# Patient Record
Sex: FEMALE | Race: WHITE | Employment: OTHER | URBAN - METROPOLITAN AREA
[De-identification: names, ages, dates, MRNs, and addresses within clinical notes are randomized per-mention and may not be internally consistent; named-entity substitution may affect disease eponyms.]

---

## 2022-12-30 ENCOUNTER — HOSPITAL ENCOUNTER (EMERGENCY)
Age: 87
Discharge: HOME | End: 2022-12-31
Attending: STUDENT IN AN ORGANIZED HEALTH CARE EDUCATION/TRAINING PROGRAM
Payer: MEDICARE

## 2022-12-30 ENCOUNTER — APPOINTMENT (OUTPATIENT)
Dept: GENERAL RADIOLOGY | Age: 87
End: 2022-12-30
Payer: MEDICARE

## 2022-12-30 DIAGNOSIS — S32.591A CLOSED FRACTURE OF RAMUS OF RIGHT PUBIS, INITIAL ENCOUNTER (HCC): Primary | ICD-10-CM

## 2022-12-30 PROCEDURE — 73502 X-RAY EXAM HIP UNI 2-3 VIEWS: CPT

## 2022-12-30 PROCEDURE — 99283 EMERGENCY DEPT VISIT LOW MDM: CPT

## 2022-12-31 VITALS
RESPIRATION RATE: 18 BRPM | SYSTOLIC BLOOD PRESSURE: 105 MMHG | BODY MASS INDEX: 22.38 KG/M2 | DIASTOLIC BLOOD PRESSURE: 78 MMHG | TEMPERATURE: 98.1 F | HEART RATE: 91 BPM | HEIGHT: 60 IN | WEIGHT: 114 LBS | OXYGEN SATURATION: 96 %

## 2022-12-31 NOTE — DISCHARGE INSTRUCTIONS
There appears to be an old pubic bone fracture. However, this is nonsurgical especially with your age and DNR status. You may use pain medication as needed and use a walker for ambulation.

## 2022-12-31 NOTE — ED PROVIDER NOTES
[address]      Pt Name: Augusta Espino  MRN: [id number]  [place] 8/19/1925  Date of evaluation: 12/30/2022  Provider: Hiwot Massey, [address] [place] [address]       Chief Complaint   Patient presents with    Fall     Pt presents via ems dt fall. Pt states she fell around 1800 onto her butt. Pt denies any pain on arrival. Pt states when she tries to stand her right leg does hurt pt state this has been ongoing for 2 weeks. Pt denies hitting of head, LOC, Nausea or vomiting. HISTORY OF PRESENT ILLNESS   (Location/Symptom, Timing/Onset, Context/Setting, Quality, Duration, Modifying Factors, Severity)  Note limiting factors. Augusta Espino is a 80 y.o. female who presents to the emergency department due to a fall. Patient resides in a nursing home facility and EMS was called after she had a mechanical trip and fall onto her buttock. She denies any head trauma, neck pain, back pain, nausea, vomiting. She states that she has been having pain in her right hip and right leg for 3 weeks or so and is having pain in the right hip now. No other complaints. HPI    Nursing Notes were reviewed. REVIEW OF SYSTEMS    (2-9 systems for level 4, 10 or more for level 5)     Review of Systems   Constitutional:  Negative for chills and fever. Respiratory:  Negative for cough and shortness of breath. Cardiovascular:  Negative for chest pain and palpitations. Gastrointestinal:  Negative for abdominal pain, nausea and vomiting. Musculoskeletal:         Positive for right hip pain and right leg pain. Positive for fall onto buttocks without head trauma. Skin:  Negative for rash and wound. Neurological:  Negative for weakness, numbness and headaches. Except as noted above the remainder of the review of systems was reviewed and negative. PAST MEDICAL HISTORY   History reviewed. No pertinent past medical history.       SURGICAL HISTORY     History reviewed. No pertinent surgical history. CURRENT MEDICATIONS       Previous Medications    No medications on file       ALLERGIES     Patient has no allergy information on record. FAMILY HISTORY     History reviewed. No pertinent family history. SOCIAL HISTORY       Social History     Socioeconomic History    Marital status: Unknown     Spouse name: None    Number of children: None    Years of education: None    Highest education level: None   Tobacco Use    Smoking status: Never    Smokeless tobacco: Never   Substance and Sexual Activity    Alcohol use: Never    Drug use: Never       SCREENINGS        [place] Coma Scale  Eye Opening: Spontaneous  Best Verbal Response: Oriented  Best Motor Response: Obeys commands  [place] Coma Scale Score: 15               PHYSICAL EXAM    (up to 7 for level 4, 8 or more for level 5)     ED Triage Vitals   BP Temp Temp Source Heart Rate Resp SpO2 Height Weight   12/30/22 2215 12/30/22 2215 12/30/22 2215 12/30/22 2215 12/30/22 2215 12/30/22 2215 12/30/22 2228 12/30/22 2228   134/83 98.1 °F (36.7 °C) Oral 91 18 95 % 5' (1.524 m) 114 lb (51.7 kg)       Physical Exam  Vitals and nursing note reviewed. Constitutional:       General: She is not in acute distress. Appearance: Normal appearance. She is not ill-appearing, toxic-appearing or diaphoretic. Cardiovascular:      Rate and Rhythm: Normal rate and regular rhythm. Pulmonary:      Effort: Pulmonary effort is normal.      Breath sounds: Normal breath sounds. Abdominal:      General: There is no distension. Palpations: Abdomen is soft. Tenderness: There is no abdominal tenderness. There is no guarding. Musculoskeletal:      Comments: Mild tenderness over the right hip however patient has been ambulatory and there is no pain with logroll of the right leg. Skin:     General: Skin is warm and dry. Neurological:      Mental Status: She is alert.       Comments: Right lower extremity is neurovascularly intact with normal pulses, sensation, strength, range of motion. DIAGNOSTIC RESULTS     EKG: All EKG's are interpreted by the Emergency Department Physician who either signs or Co-signs this chart in the absence of a cardiologist.      RADIOLOGY:   Non-plain film images such as CT, Ultrasound and MRI are read by the radiologist. Plain radiographic images are visualized and preliminarily interpreted by the emergency physician with the below findings:      Interpretation per the Radiologist below, if available at the time of this note:    XR HIP 2-3 VW W PELVIS RIGHT   Final Result   Mildly displaced right superior and inferior pubic ramus fractures, age   indeterminate. Mildly displaced right pubis fracture, age indeterminate. Low   bone density. Right femoral neck nail, prior plate and screw fixation of   proximal right femur without signs of hardware failure. ED BEDSIDE ULTRASOUND:   Performed by ED Physician - none    LABS:  Labs Reviewed - No data to display    All other labs were within normal range or not returned as of this dictation. EMERGENCY DEPARTMENT COURSE and DIFFERENTIAL DIAGNOSIS/MDM:   Vitals:    Vitals:    12/30/22 2215 12/30/22 2228 12/31/22 0015   BP: 134/83  105/78   Pulse: 91     Resp: 18     Temp: 98.1 °F (36.7 °C)     TempSrc: Oral     SpO2: 95%  96%   Weight:  51.7 kg (114 lb)    Height:  5' (1.524 m)        Patient is a 20-year-old female DNR CC presenting from nursing home after a fall onto her buttocks. On exam vitals normal patient is in no acute distress. No other signs of trauma. No head trauma. Heart and lung sounds normal, abdomen soft and nontender. There is mild tenderness over the right hip however there is no reproduction of pain with logroll of the right leg. The extremity is neurovascular intact. Plain film of the right hip does reveal age-indeterminate superior and inferior rami fractures.   However the patient has had multiple falls over the past week and has still been ambulatory with a walker. As her injury is nonsurgical and she is a DNR CC and wants only minimal intervention at this time, patient discharged back to nursing home facility. Patient and family agree with this plan. MDM        REASSESSMENT          CRITICAL CARE TIME       CONSULTS:  None    PROCEDURES:  Unless otherwise noted below, none     Procedures      FINAL IMPRESSION      1. Closed fracture of ramus of right pubis, initial encounter [facility])          DISPOSITION/PLAN   DISPOSITION Decision To Discharge 12/30/2022 11:42:07 PM      PATIENT REFERRED TO:  Provider Unknown, MD  Patient not available to ask    Schedule an appointment as soon as possible for a visit       DISCHARGE MEDICATIONS:  New Prescriptions    No medications on file     Controlled Substances Monitoring:     No flowsheet data found.     (Please note that portions of this note were completed with a voice recognition program.  Efforts were made to edit the dictations but occasionally words are mis-transcribed.)    Justine Velazquez DO (electronically signed)  Attending Emergency Physician            Fabricio Cooper DO  12/31/22 [id number]

## 2023-01-16 ENCOUNTER — HOSPITAL ENCOUNTER (INPATIENT)
Age: 88
LOS: 1 days | Discharge: HOSPICE | DRG: 543 | End: 2023-01-18
Attending: EMERGENCY MEDICINE | Admitting: STUDENT IN AN ORGANIZED HEALTH CARE EDUCATION/TRAINING PROGRAM
Payer: MEDICARE

## 2023-01-16 ENCOUNTER — APPOINTMENT (OUTPATIENT)
Dept: CT IMAGING | Age: 88
DRG: 543 | End: 2023-01-16
Payer: MEDICARE

## 2023-01-16 ENCOUNTER — APPOINTMENT (OUTPATIENT)
Dept: GENERAL RADIOLOGY | Age: 88
DRG: 543 | End: 2023-01-16
Payer: MEDICARE

## 2023-01-16 DIAGNOSIS — S09.90XA CLOSED HEAD INJURY, INITIAL ENCOUNTER: Primary | ICD-10-CM

## 2023-01-16 DIAGNOSIS — S32.19XA OTHER FRACTURE OF SACRUM, INITIAL ENCOUNTER FOR CLOSED FRACTURE (HCC): ICD-10-CM

## 2023-01-16 DIAGNOSIS — R29.6 RECURRENT FALLS: ICD-10-CM

## 2023-01-16 DIAGNOSIS — S32.592A CLOSED FRACTURE OF LEFT INFERIOR PUBIC RAMUS, INITIAL ENCOUNTER (HCC): ICD-10-CM

## 2023-01-16 DIAGNOSIS — R91.1 PULMONARY NODULE: ICD-10-CM

## 2023-01-16 DIAGNOSIS — S32.009A CLOSED FRACTURE OF TRANSVERSE PROCESS OF LUMBAR VERTEBRA, INITIAL ENCOUNTER (HCC): ICD-10-CM

## 2023-01-16 DIAGNOSIS — S32.512A CLOSED FRACTURE OF SUPERIOR RAMUS OF LEFT PUBIS, INITIAL ENCOUNTER (HCC): ICD-10-CM

## 2023-01-16 PROCEDURE — 72131 CT LUMBAR SPINE W/O DYE: CPT

## 2023-01-16 PROCEDURE — 72128 CT CHEST SPINE W/O DYE: CPT

## 2023-01-16 PROCEDURE — 6370000000 HC RX 637 (ALT 250 FOR IP): Performed by: EMERGENCY MEDICINE

## 2023-01-16 PROCEDURE — 99285 EMERGENCY DEPT VISIT HI MDM: CPT

## 2023-01-16 PROCEDURE — 70450 CT HEAD/BRAIN W/O DYE: CPT

## 2023-01-16 PROCEDURE — 72125 CT NECK SPINE W/O DYE: CPT

## 2023-01-16 PROCEDURE — 73502 X-RAY EXAM HIP UNI 2-3 VIEWS: CPT

## 2023-01-16 RX ORDER — METOPROLOL SUCCINATE 25 MG/1
25 TABLET, EXTENDED RELEASE ORAL DAILY
COMMUNITY

## 2023-01-16 RX ORDER — HYDROCODONE BITARTRATE AND ACETAMINOPHEN 5; 325 MG/1; MG/1
1 TABLET ORAL EVERY 6 HOURS PRN
Status: ON HOLD | COMMUNITY
End: 2023-01-18 | Stop reason: SDUPTHER

## 2023-01-16 RX ORDER — TRIAMTERENE AND HYDROCHLOROTHIAZIDE 37.5; 25 MG/1; MG/1
1 TABLET ORAL DAILY
Status: ON HOLD | COMMUNITY
End: 2023-01-18 | Stop reason: HOSPADM

## 2023-01-16 RX ORDER — DOCUSATE SODIUM 100 MG/1
100 CAPSULE, LIQUID FILLED ORAL 2 TIMES DAILY PRN
COMMUNITY

## 2023-01-16 RX ORDER — CETIRIZINE HYDROCHLORIDE 10 MG/1
10 TABLET ORAL DAILY
COMMUNITY

## 2023-01-16 RX ORDER — ACETAMINOPHEN 325 MG/1
TABLET ORAL EVERY 6 HOURS PRN
COMMUNITY

## 2023-01-16 RX ORDER — OXYCODONE HYDROCHLORIDE AND ACETAMINOPHEN 5; 325 MG/1; MG/1
1 TABLET ORAL ONCE
Status: COMPLETED | OUTPATIENT
Start: 2023-01-16 | End: 2023-01-16

## 2023-01-16 RX ADMIN — OXYCODONE HYDROCHLORIDE AND ACETAMINOPHEN 1 TABLET: 5; 325 TABLET ORAL at 22:16

## 2023-01-16 ASSESSMENT — PAIN DESCRIPTION - LOCATION: LOCATION: HIP;BUTTOCKS

## 2023-01-16 ASSESSMENT — PAIN SCALES - GENERAL
PAINLEVEL_OUTOF10: 9
PAINLEVEL_OUTOF10: 9

## 2023-01-16 ASSESSMENT — PAIN - FUNCTIONAL ASSESSMENT: PAIN_FUNCTIONAL_ASSESSMENT: 0-10

## 2023-01-17 PROBLEM — R29.6 RECURRENT FALLS: Status: ACTIVE | Noted: 2023-01-17

## 2023-01-17 LAB
ANION GAP SERPL CALCULATED.3IONS-SCNC: 11 MMOL/L (ref 9–17)
BUN BLDV-MCNC: 17 MG/DL (ref 8–23)
CALCIUM SERPL-MCNC: 9.2 MG/DL (ref 8.6–10.4)
CHLORIDE BLD-SCNC: 96 MMOL/L (ref 98–107)
CO2: 25 MMOL/L (ref 20–31)
CREAT SERPL-MCNC: 0.85 MG/DL (ref 0.5–0.9)
GFR SERPL CREATININE-BSD FRML MDRD: >60 ML/MIN/1.73M2
GLUCOSE BLD-MCNC: 160 MG/DL (ref 70–99)
HCT VFR BLD CALC: 27.1 % (ref 36–46)
HEMOGLOBIN: 8.7 G/DL (ref 12–16)
INR BLD: 1
MCH RBC QN AUTO: 30.1 PG (ref 26–34)
MCHC RBC AUTO-ENTMCNC: 32.1 G/DL (ref 31–37)
MCV RBC AUTO: 93.8 FL (ref 80–100)
PDW BLD-RTO: 15.5 % (ref 12.5–15.4)
PLATELET # BLD: 350 K/UL (ref 140–450)
PMV BLD AUTO: 8.4 FL (ref 8–14)
POTASSIUM SERPL-SCNC: 3.9 MMOL/L (ref 3.7–5.3)
PROTHROMBIN TIME: 10.7 SEC (ref 9.4–12.6)
RBC # BLD: 2.89 M/UL (ref 4–5.2)
SODIUM BLD-SCNC: 132 MMOL/L (ref 135–144)
WBC # BLD: 8.2 K/UL (ref 3.5–11)

## 2023-01-17 PROCEDURE — 36415 COLL VENOUS BLD VENIPUNCTURE: CPT

## 2023-01-17 PROCEDURE — 1210000000 HC MED SURG R&B

## 2023-01-17 PROCEDURE — 85610 PROTHROMBIN TIME: CPT

## 2023-01-17 PROCEDURE — 97535 SELF CARE MNGMENT TRAINING: CPT

## 2023-01-17 PROCEDURE — 97530 THERAPEUTIC ACTIVITIES: CPT

## 2023-01-17 PROCEDURE — 6370000000 HC RX 637 (ALT 250 FOR IP): Performed by: NURSE PRACTITIONER

## 2023-01-17 PROCEDURE — 2580000003 HC RX 258: Performed by: NURSE PRACTITIONER

## 2023-01-17 PROCEDURE — 97166 OT EVAL MOD COMPLEX 45 MIN: CPT

## 2023-01-17 PROCEDURE — 85027 COMPLETE CBC AUTOMATED: CPT

## 2023-01-17 PROCEDURE — 80048 BASIC METABOLIC PNL TOTAL CA: CPT

## 2023-01-17 PROCEDURE — 6360000002 HC RX W HCPCS: Performed by: NURSE PRACTITIONER

## 2023-01-17 PROCEDURE — 97162 PT EVAL MOD COMPLEX 30 MIN: CPT

## 2023-01-17 RX ORDER — SODIUM CHLORIDE 0.9 % (FLUSH) 0.9 %
5-40 SYRINGE (ML) INJECTION EVERY 12 HOURS SCHEDULED
Status: DISCONTINUED | OUTPATIENT
Start: 2023-01-17 | End: 2023-01-18 | Stop reason: HOSPADM

## 2023-01-17 RX ORDER — ONDANSETRON 2 MG/ML
4 INJECTION INTRAMUSCULAR; INTRAVENOUS EVERY 6 HOURS PRN
Status: DISCONTINUED | OUTPATIENT
Start: 2023-01-17 | End: 2023-01-18 | Stop reason: HOSPADM

## 2023-01-17 RX ORDER — MAGNESIUM SULFATE 1 G/100ML
1000 INJECTION INTRAVENOUS PRN
Status: DISCONTINUED | OUTPATIENT
Start: 2023-01-17 | End: 2023-01-18 | Stop reason: HOSPADM

## 2023-01-17 RX ORDER — HYDROCODONE BITARTRATE AND ACETAMINOPHEN 5; 325 MG/1; MG/1
1 TABLET ORAL EVERY 6 HOURS PRN
Status: DISCONTINUED | OUTPATIENT
Start: 2023-01-17 | End: 2023-01-18 | Stop reason: HOSPADM

## 2023-01-17 RX ORDER — MORPHINE SULFATE 4 MG/ML
4 INJECTION, SOLUTION INTRAMUSCULAR; INTRAVENOUS ONCE
Status: DISCONTINUED | OUTPATIENT
Start: 2023-01-17 | End: 2023-01-17

## 2023-01-17 RX ORDER — POTASSIUM CHLORIDE 7.45 MG/ML
10 INJECTION INTRAVENOUS PRN
Status: DISCONTINUED | OUTPATIENT
Start: 2023-01-17 | End: 2023-01-18 | Stop reason: HOSPADM

## 2023-01-17 RX ORDER — TRIAMTERENE AND HYDROCHLOROTHIAZIDE 37.5; 25 MG/1; MG/1
1 TABLET ORAL DAILY
Status: DISCONTINUED | OUTPATIENT
Start: 2023-01-17 | End: 2023-01-18

## 2023-01-17 RX ORDER — SODIUM CHLORIDE 9 MG/ML
INJECTION, SOLUTION INTRAVENOUS PRN
Status: DISCONTINUED | OUTPATIENT
Start: 2023-01-17 | End: 2023-01-18 | Stop reason: HOSPADM

## 2023-01-17 RX ORDER — ACETAMINOPHEN 325 MG/1
650 TABLET ORAL EVERY 6 HOURS PRN
Status: DISCONTINUED | OUTPATIENT
Start: 2023-01-17 | End: 2023-01-18 | Stop reason: HOSPADM

## 2023-01-17 RX ORDER — ONDANSETRON 4 MG/1
4 TABLET, ORALLY DISINTEGRATING ORAL EVERY 8 HOURS PRN
Status: DISCONTINUED | OUTPATIENT
Start: 2023-01-17 | End: 2023-01-18 | Stop reason: HOSPADM

## 2023-01-17 RX ORDER — SODIUM CHLORIDE 0.9 % (FLUSH) 0.9 %
10 SYRINGE (ML) INJECTION PRN
Status: DISCONTINUED | OUTPATIENT
Start: 2023-01-17 | End: 2023-01-18 | Stop reason: HOSPADM

## 2023-01-17 RX ORDER — POTASSIUM CHLORIDE 20 MEQ/1
40 TABLET, EXTENDED RELEASE ORAL PRN
Status: DISCONTINUED | OUTPATIENT
Start: 2023-01-17 | End: 2023-01-18 | Stop reason: HOSPADM

## 2023-01-17 RX ORDER — POLYETHYLENE GLYCOL 3350 17 G/17G
17 POWDER, FOR SOLUTION ORAL DAILY PRN
Status: DISCONTINUED | OUTPATIENT
Start: 2023-01-17 | End: 2023-01-18 | Stop reason: HOSPADM

## 2023-01-17 RX ORDER — METOPROLOL SUCCINATE 25 MG/1
25 TABLET, EXTENDED RELEASE ORAL DAILY
Status: DISCONTINUED | OUTPATIENT
Start: 2023-01-17 | End: 2023-01-18 | Stop reason: HOSPADM

## 2023-01-17 RX ADMIN — HYDROCODONE BITARTRATE AND ACETAMINOPHEN 1 TABLET: 5; 325 TABLET ORAL at 17:45

## 2023-01-17 RX ADMIN — SODIUM CHLORIDE, PRESERVATIVE FREE 10 ML: 5 INJECTION INTRAVENOUS at 21:02

## 2023-01-17 RX ADMIN — APIXABAN 2.5 MG: 2.5 TABLET, FILM COATED ORAL at 21:02

## 2023-01-17 RX ADMIN — ONDANSETRON 4 MG: 2 INJECTION INTRAMUSCULAR; INTRAVENOUS at 21:02

## 2023-01-17 RX ADMIN — HYDROCODONE BITARTRATE AND ACETAMINOPHEN 1 TABLET: 5; 325 TABLET ORAL at 10:08

## 2023-01-17 RX ADMIN — HYDROCODONE BITARTRATE AND ACETAMINOPHEN 1 TABLET: 5; 325 TABLET ORAL at 03:37

## 2023-01-17 RX ADMIN — METOPROLOL SUCCINATE 25 MG: 25 TABLET, EXTENDED RELEASE ORAL at 10:08

## 2023-01-17 RX ADMIN — APIXABAN 2.5 MG: 2.5 TABLET, FILM COATED ORAL at 10:08

## 2023-01-17 RX ADMIN — TRIAMTERENE AND HYDROCHLOROTHIAZIDE 1 TABLET: 37.5; 25 TABLET ORAL at 10:08

## 2023-01-17 ASSESSMENT — PAIN DESCRIPTION - DESCRIPTORS
DESCRIPTORS: ACHING

## 2023-01-17 ASSESSMENT — PAIN SCALES - GENERAL
PAINLEVEL_OUTOF10: 8
PAINLEVEL_OUTOF10: 7

## 2023-01-17 ASSESSMENT — PAIN DESCRIPTION - LOCATION
LOCATION: BACK;BUTTOCKS
LOCATION: BACK
LOCATION: BACK
LOCATION: GENERALIZED

## 2023-01-17 NOTE — ED NOTES
Writer called report to [employer] on inpatient unit. Pt ready for transport to room 338.      Elvin Reilly RN  01/17/23 [id number]

## 2023-01-17 NOTE — PROGRESS NOTES
Physical Therapy  Facility/Department: [facility] ICU  Physical Therapy Initial Assessment    Name: Fabian Jones  : 1925  MRN: [id number]  Date of Service: 2023    Discharge Recommendations:  Patient would benefit from continued therapy after discharge      Fabian Jones is a 80 y.o. female who presents for evaluation after a fall. The patient has history of dementia and is a poor historian. Based on chart review she has had multiple falls and this has increased recently. [facility] a fall that resulted in right superior and inferior pubic rami fractures, thoracic and lumbar compression fractures, and bilateral rib fractures on 12/15/2022. The patient reports that tonight she was attempting to walk to the bathroom on her own when she lost her balance and fell onto her left side. She states that she struck the back of her head and landed on her left hip. She was able to call for help and staff immediately came to her side. The patient denies loss of consciousness. Based on chart review she is on Eliquis for paroxysmal A. fib. The patient complains of generalized pain with increased pain to the back of her head and to her left hip. She states her pain is worse with movement. She has not taken any medications for pain and does not list any other provoking or palliating factors. Based on her paperwork she is taking Norco at home. The patient is a DNR CC. She arrives by EMS and has refused IV or IM medications. She denies fever, chills, neck pain, vision changes, chest pain, shortness of breath, abdominal pain, urinary/bowel symptoms, focal weakness, numbness, tingling, recent illness. Patient Diagnosis(es): The primary encounter diagnosis was Closed head injury, initial encounter.  Diagnoses of Closed fracture of superior ramus of left pubis, initial encounter [facility]), Closed fracture of left inferior pubic ramus, initial encounter [facility]), Closed fracture of transverse process of lumbar vertebra, initial encounter [facility]), Other fracture of sacrum, initial encounter for closed fracture [facility]), and Pulmonary nodule were also pertinent to this visit. Past Medical History:  has a past medical history of Atrial fibrillation ([address] Cognitive communication deficit, Compression fracture of L2 ([address] Compression fracture of thoracic vertebra ([address] Dementia ([facility] [address] Encephalopathy, Fall, Hypertension, Muscle weakness, Osteoarthritis, Pneumonia, Rhabdomyolysis, Rib fractures, Sinusitis, and Spinal stenosis. Past Surgical History:  has a past surgical history that includes Kyphosis surgery. Assessment   Body Structures, Functions, Activity Limitations Requiring Skilled Therapeutic Intervention: Decreased functional mobility ; Decreased safe awareness;Decreased balance  Assessment: The patient presents with recurrent falls with decreased safety awareness and h/o dementia. The patient would require 24 hour care at discharge due to safety and assistance level required with all mobility. Therapy Prognosis: Fair  Decision Making: Medium Complexity  Requires PT Follow-Up: Yes  Activity Tolerance  Activity Tolerance: Treatment limited secondary to decreased cognition     Plan   Physcial Therapy Plan  General Plan:  (5-6x/week)  Current Treatment Recommendations: Strengthening, Balance training, Functional mobility training, Gait training, Stair training, Home exercise program, Safety education & training, Therapeutic activities, Transfer training, Endurance training  Safety Devices  Type of Devices: Nurse notified, Left in bed, Patient at risk for falls, Gait belt, Bed alarm in place, All gale prominences offloaded  Restraints  Restraints Initially in Place: No     Restrictions  Restrictions/Precautions  Restrictions/Precautions: Fall Risk  Position Activity Restriction  Other position/activity restrictions: WBAT, with no restrictions per Dr. Aníbal Rouse.  Also has order for Up with assistance. Subjective   General  Patient assessed for rehabilitation services?: Yes  Family / Caregiver Present: No  Diagnosis: recurrent falls  Subjective  Subjective: The patient wishes to go to the bathroom. Social/Functional History  Social/Functional History  Type of Home: Facility ([address] [place] [address])  ADL Assistance: Needs assistance  Ambulation Assistance: Needs assistance  Transfer Assistance: Needs assistance  Additional Comments: The patient was at [address] [place] [address] prior to this admission. The patient was not able to answer her level of assistance, but likely required assistance for all mobility for safety. The patient would no tbe safe to ambulate alone or transfer alone based on current presentation. Vision/Hearing  Hearing  Hearing: Within functional limits    Cognition   Orientation  Overall Orientation Status: Impaired  Orientation Level: Oriented to person;Disoriented to place; Disoriented to time;Disoriented to situation  Cognition  Overall Cognitive Status: Exceptions  Arousal/Alertness: Delayed responses to stimuli  Following Commands: Follows one step commands with increased time; Follows one step commands with repetition  Attention Span: Difficulty dividing attention; Difficulty attending to directions  Safety Judgement: Decreased awareness of need for assistance  Problem Solving: Decreased awareness of errors  Insights: Decreased awareness of deficits  Initiation: Requires cues for all  Sequencing: Requires cues for all     Objective     Generalized pain, but the patient did not rate. AROM RLE (degrees)  RLE AROM: WFL  AROM LLE (degrees)  LLE AROM : WFL  AROM RUE (degrees)  RUE AROM : WFL  AROM LUE (degrees)  LUE AROM : WFL  Strength RLE  Comment: unable to follow commands well enough to test formally  R Hip Flexion: 3+/5; At least  R Hip ABduction: 3+/5; At least  R Hip ADduction: 3+/5; At least  R Knee Flexion: 3+/5; At least  R Knee Extension: 3+/5; At least  R Ankle Dorsiflexion: 3+/5; At least  R Ankle Plantar flexion: 3+/5; At least  Strength LLE  Strength LLE: Exception  Comment: unable to follow commands well enough to test formally  L Hip Flexion: 3+/5; At least  L Hip ABduction: 3+/5; At least  L Hip ADduction: 3+/5; At least  L Knee Flexion: 3+/5; At least  L Knee Extension: 3+/5; At least  L Ankle Dorsiflexion: 3+/5; At least  L Ankle Plantar Flexion: 3+/5; At least  Strength RUE  Comment: unable to follow commands well enough to test formally, but uses both UE equally  Strength LUE  Comment: unable to follow commands well enough to test formally, but uses both UE equally        Bed Mobility Training  Bed Mobility Training: Yes  Overall Level of Assistance: Moderate assistance;Assist X2  Transfer Training  Transfer Training: Yes  Overall Level of Assistance: Moderate assistance;Assist X2  Sit to Stand: Moderate assistance;Assist X2  Stand to Sit: Moderate assistance;Assist X2  Bed mobility  Supine to Sit: Moderate assistance;2 Person assistance  Sit to Supine: Moderate assistance;2 Person assistance  Scooting: Moderate assistance;2 Person assistance  Transfers  Sit to Stand: 2 Person Assistance; Moderate Assistance  Stand to Sit: Moderate Assistance;2 Person Assistance  Stand Pivot Transfers: 2 Person Assistance; Moderate Assistance  Comment: transfer to commode with MoDAx2; equally unsteady with or without walker for transfers  Ambulation  Surface: Level tile  Device: Rolling Walker  Assistance: Moderate assistance;2 Person assistance  Quality of Gait: attempted to ambulate to commode or toilet, however the patient took 2 steps and was very unsteady and unpredictable therefore transfer to and from commode only performed.      Balance  Sitting - Static: Fair  Sitting - Dynamic: Fair;-  Standing - Static: Poor  Standing - Dynamic: Poor    AM-PAC Score  AM-PAC Inpatient Mobility Raw Score : 10 (01/17/23 1203)  AM-PAC Inpatient T-Scale Score : 32.29 (01/17/23 1203)  Mobility Inpatient CMS 0-100% Score: 76.75 (01/17/23 1203)  Mobility Inpatient CMS G-Code Modifier : CL (01/17/23 1203)    Goals  Short Term Goals  Time Frame for Short Term Goals: 14 visits  Short Term Goal 1: The patient will perform transfers with CGA and RW. Short Term Goal 2: The patient will ambulate 80ft with RW and CGA. Short Term Goal 3: The patient will improve BLE strength to 4/5 for safe transfers and gait. Short Term Goal 4: The patient will be able to perform bed mobility with CGA. Patient Goals   Patient Goals : Did not state       Education  Patient Education  Education Given To: Patient  Education Provided: Role of Therapy  Education Method: Verbal  Barriers to Learning: Cognition  Education Outcome: Continued education needed; Unable to demonstrate understanding      Therapy Time   Individual Concurrent Group Co-treatment   Time In 1020         Time Out 1056         Minutes 36         Timed Code Treatment Minutes: 9 Minutes       Elizabeth Romano PT

## 2023-01-17 NOTE — PROGRESS NOTES
Occupational Therapy  Facility/Department: [facility]  Occupational Therapy Initial Assessment       Name: Ranjana Cronin  : 1925  MRN: [id number]  Date of Service: 2023    Chief Complaint   Patient presents with    Fall    Hip Pain    Head Injury     Discharge Recommendations:  Patient would benefit from continued therapy after discharge, 24 hour supervision or assist  OT Equipment Recommendations  Equipment Needed: No     Patient Diagnosis(es): The primary encounter diagnosis was Closed head injury, initial encounter. Diagnoses of Closed fracture of superior ramus of left pubis, initial encounter [facility]), Closed fracture of left inferior pubic ramus, initial encounter [facility]), Closed fracture of transverse process of lumbar vertebra, initial encounter ([facility] [address] Other fracture of sacrum, initial encounter for closed fracture [facility]), and Pulmonary nodule were also pertinent to this visit. Past Medical History:  has a past medical history of Atrial fibrillation ([address] Cognitive communication deficit, Compression fracture of L2 ([address] Compression fracture of thoracic vertebra ([address] Dementia ([address] Encephalopathy, Fall, Hypertension, Muscle weakness, Osteoarthritis, Pneumonia, Rhabdomyolysis, Rib fractures, Sinusitis, and Spinal stenosis. Past Surgical History:  has a past surgical history that includes Kyphosis surgery. Assessment   Performance deficits / Impairments: Decreased functional mobility ; Decreased ADL status; Decreased balance;Decreased strength;Decreased cognition;Decreased fine motor control;Decreased safe awareness;Decreased endurance  Assessment: Pt has demonstrated deficits at this time with her ability to safely complete daily tasks, decreased balance and impaired mobility, decreased endurance / activity tolerance, impaired cognition, poor cognition // safety. At this time, the Pt has decreased ability to return to [facility] and prior living situation.   She will benefit from continued participation in acute and post-acute OT services to improve regain functional activity participation / improve safety and reduce risk of future falls. Prognosis: Fair  Decision Making: Medium Complexity  REQUIRES OT FOLLOW-UP: Yes  Activity Tolerance  Activity Tolerance: Patient limited by fatigue;Patient limited by pain;Treatment limited secondary to decreased cognition;Treatment limited secondary to agitation          Plan   Occupational Therapy Plan  Times Per Week: 4-5x/week  Times Per Day: Once a day  Current Treatment Recommendations: Strengthening, Balance training, Functional mobility training, Endurance training, ROM, Equipment evaluation, education, & procurement, Patient/Caregiver education & training, Pain management, Cognitive reorientation, Self-Care / ADL, Safety education & training, Cognitive/Perceptual training       Restrictions  Restrictions/Precautions  Restrictions/Precautions: Fall Risk  Position Activity Restriction  Other position/activity restrictions: WBAT, with no restrictions per Dr. Ranjit Cabrera. Also has order for Up with assistance. Subjective   General  Patient assessed for rehabilitation services?: Yes  Family / Caregiver Present: No  Diagnosis: Per EMR and H&P:  Patient is a 80 y.o. Non- / non  female who presents with Fall, Hip Pain, and Head Injury. She suffers from dementia and was brought to the hospital after a fall. Patient has had multiple falls and did sustain an acute fracture of the sacrum and L5 transverse process on the left. She has multiple age-indeterminate compression fractures and multiple vertebroplasty's have been noted. This is 1 of many falls and she is weightbearing as tolerated. She was brought from an extended care facility. She was evaluated in the emergency room and family are requesting placement to a memory care unit. At this point in time the patient feels reasonably well.   She has pain however it is under reasonable control with Norco.  She can be weightbearing as tolerated and will titrate her pain control as needed. The patient denies any questions or concerns. She is pleasantly confused. Per Hospitalist, Pt can be WBAT, will not require Ortho consult d/t age-indeterminate fractures. Subjective  Subjective: RN approved Pt to be seen for OT // PT Evaluations. General Comment  Comments: Pt was agreeable to participate with instruction // coaxing // encouragement. Social/Functional History  Social/Functional History  Lives With: Alone  Type of Home: Facility  ADL Assistance: Needs assistance  Toileting: Needs assistance  Homemaking Assistance: Needs assistance  Ambulation Assistance: Needs assistance  Transfer Assistance: Needs assistance  Active : No  Additional Comments: The patient was at [address] [place] [address] prior to this admission. The patient was not able to answer her level of assistance, but likely required assistance for all mobility for safety. The patient would no tbe safe to ambulate alone or transfer alone based on current presentation. Objective   Safety Devices  Type of Devices: Nurse notified; Left in bed;Patient at risk for falls;Gait belt;Bed alarm in place; All gale prominences offloaded  Restraints  Restraints Initially in Place: No    Bed Mobility Training  Bed Mobility Training: Yes  Overall Level of Assistance: Moderate assistance;Assist X2 (HOB elevated, Bilateral Bedrails, Significant increase in time // effort // cues)  Interventions: Visual cues; Verbal cues;Manual cues; Safety awareness training (Max Cues task initiation & sequencing // accurate integration of BUEs)  Rolling: Moderate assistance;Assist X2  Supine to Sit: Moderate assistance;Assist X2  Sit to Supine: Moderate assistance;Assist X2  Scooting: Moderate assistance;Assist X2    Balance  Sitting: With support  Standing: With support  Transfer Training  Transfer Training: Yes  Overall Level of Assistance:  Moderate assistance;Assist X2;Additional time (Initial attempts at Sit to Stand // Stand to Sit with RW (very unsuccessful, poor motor planning, poor direction following d/t cognitive deficits).  //  Transitioned to Mod x2 (side by side // fface to face) Pt demo'd improvement in participation.)  Interventions: Visual cues; Verbal cues;Manual cues; Safety awareness training (Max Cues task initiation & motor planning // accurate technique // hand over hand initiation on BUEs and Hands)  Sit to Stand: Moderate assistance;Assist X2;Additional time  Stand to Sit: Moderate assistance; Additional time;Assist X2  Toilet Transfer: Moderate assistance;Assist X2;Adaptive equipment; Additional time (Transfer to // from Standard BSC to EOB. Mod x2 (including both side by side transfer and face to face transfers). Significant increase in time // effort.)    Gait  Overall Level of Assistance:  (Initial attempts at Sit to Stand // Stand to Sit with RW (very unsuccessful, poor motor planning, poor direction following d/t cognitive deficits). Unable to continue with mobility this date. Currently participating in stand-pivot t/f only.)    AROM: Within functional limits (Not able to formally assess d/t cognitive impairments. However, Pt was able to demonstrate Bilateral UE AROM // Bilateral UE Strength & Grasp WFL during function this date.)  Strength: Within functional limits  Coordination: Within functional limits    ADL  Grooming: Minimal assistance; Increased time to complete;Verbal cueing  Grooming Skilled Clinical Factors: Sitting EOB to perform hand hygiene. UE Dressing: Moderate assistance;Verbal cueing; Increased time to complete  UE Dressing Skilled Clinical Factors: Sitting EOB, UBD to doff robe then don clean gown. LE Dressing: Maximum assistance; Increased time to complete;Verbal cueing  LE Dressing Skilled Clinical Factors: Pt attempted to don Bilateral socks sitting EOB, but was unable to follow // motor plan these instructions. Toileting Skilled Clinical Factors: Using Standard BSC, Pt participated in [place] (standing with Max Assist), Brief Management (Max Assist). Additional Comments: Max VCs / Instruction and Intermittent hand over hand for facilitation // initiation of task participation. Required significant increase in time for task completion of all tasks. Activity Tolerance  Activity Tolerance: Treatment limited secondary to decreased cognition    Hearing  Hearing: Within functional limits  Cognition  Overall Cognitive Status: Exceptions  Arousal/Alertness: Delayed responses to stimuli  Following Commands: Follows one step commands with increased time; Follows one step commands with repetition  Attention Span: Difficulty dividing attention; Difficulty attending to directions  Safety Judgement: Decreased awareness of need for assistance;Decreased awareness of need for safety  Problem Solving: Decreased awareness of errors  Insights: Decreased awareness of deficits  Initiation: Requires cues for all  Sequencing: Requires cues for all  Orientation  Overall Orientation Status: Impaired  Orientation Level: Oriented to person;Disoriented to place; Disoriented to time;Disoriented to situation    Education Given To: Patient;Staff  Education Provided: Role of Therapy;Plan of Care;Orientation      AM-[facility] Score  [facility] Inpatient Daily Activity Raw Score: 11 (01/17/23 1736)  -PAC Inpatient ADL T-Scale Score : 29.04 (01/17/23 1736)  ADL Inpatient CMS 0-100% Score: 70.42 (01/17/23 1736)  ADL Inpatient CMS G-Code Modifier : CL (01/17/23 1736)      Goals  Short Term Goals  Time Frame for Short Term Goals: Within 14 treatment sessions -  Short Term Goal 1: Pt will demo Fair Tolerance to // Participation with progressive HEP (BUE ROM // Strength & Resistance // Bilat Grasp). Short Term Goal 2: Pt will maintain Dynamic Sitting Tolerance (>10 mins) while participating in UB ADLs.   Short Term Goal 3: Pt will participate in ADL // Bathroom Transfers with CGA and without LOB. Short Term Goal 4: Pt will maintain Dynamic Standing Tolerance (5-6 mins) to improve functional task participation.        Therapy Time   Individual Concurrent Group Co-treatment   Time In 1020         Time Out 1056         Minutes 36         Timed Code Treatment Minutes: 10 Minutes (ADL)       MARLEY Loredo, OTR/L

## 2023-01-17 NOTE — CARE COORDINATION
Case Management Assessment  Initial Evaluation    Date/Time of Evaluation: 1/17/2023 3:09 PM  Assessment Completed by: Radha Daniel RN    If patient is discharged prior to next notation, then this note serves as note for discharge by case management. Patient Name: Katy Conti                   YOB: 1925  Diagnosis: Pulmonary nodule [R91.1]  Recurrent falls [R29.6]  Closed head injury, initial encounter [S09.90XA]  Closed fracture of transverse process of lumbar vertebra, initial encounter ([facility] [address] [S32.009A]  Closed fracture of left inferior pubic ramus, initial encounter ([address] Ulises Arambula  Other fracture of sacrum, initial encounter for closed fracture ([address] [S32.19XA]  Closed fracture of superior ramus of left pubis, initial encounter ([address] Bret Leavitt                   Date / Time: 1/16/2023  9:52 PM    Patient Admission Status: Inpatient   Readmission Risk (Low < 19, Mod (19-27), High > 27): Readmission Risk Score: 15.2    Current PCP: Jodi Kowalski MD  PCP verified by CM? Chart Reviewed: Yes      History Provided by: Child/Family, Patient, Medical Record  Patient Orientation: Person    Patient Cognition: Dementia / Early Alzheimer's    Hospitalization in the last 30 days (Readmission):  No    If yes, Readmission Assessment in  Navigator will be completed. Advance Directives:      Code Status: DNR-CC   Patient's Primary Decision Maker is:      Primary Decision MakerLorilee Form Child - [phone]    Discharge Planning:    Patient lives with: Alone Type of Home: [place]  Primary Care Giver:  Other (Comment)  Patient Support Systems include: Children   Current Financial resources:    Current community resources:    Current services prior to admission: None            Current DME:              Type of Home Care services:  None    ADLS  Prior functional level: Assistance with the following:  Current functional level: Assistance with the following:    PT AM-PAC: 10 /24  OT AM-PAC:   /24    Family can provide assistance at DC: Would you like Case Management to discuss the discharge plan with any other family members/significant others, and if so, who? Plans to Return to Present Housing: Unknown at present  Other Identified Issues/Barriers to RETURNING to current housing: NA  Potential Assistance needed at discharge: [address], Lincoln Leos            Potential DME:    Patient expects to discharge to: [address] for transportation at discharge: Family    Financial    Payor: MEDICARE / Plan: MEDICARE PART A AND B / Product Type: *No Product type* /     Does insurance require precert for SNF: Yes    Potential assistance Purchasing Medications:    Meds-to-Beds request: No    No Pharmacies Listed    Notes:    Factors facilitating achievement of predicted outcomes:     Barriers to discharge: Additional Case Management Notes:   Spoke with family at length about different options for long term care. Long term care at a nursing facility vs. Memory care. Spoke with son, Mary Roper [facility]), who is requesting a Hospice consult. Referral to [facility]. Dr. Edy cohen. Sophia Carrizales [address] with [facility] of [address] [place] [address]. They will complete remote review tomorrow morning and meet with family at their home to discuss options at 1300 1/18.             The Plan for Transition of Care is related to the following treatment goals of Pulmonary nodule [R91.1]  Recurrent falls [R29.6]  Closed head injury, initial encounter [S09.90XA]  Closed fracture of transverse process of lumbar vertebra, initial encounter ([facility] [address] [S32.009A]  Closed fracture of left inferior pubic ramus, initial encounter ([address] [S32.592A]  Other fracture of sacrum, initial encounter for closed fracture ([address] [S32.19XA]  Closed fracture of superior ramus of left pubis, initial encounter ([facility] [address] [X53.285T]    IF APPLICABLE: The Patient and/or patient representative Nedra Florian and her family were provided with a choice of provider and agrees with the discharge plan. Freedom of choice list with basic dialogue that supports the patient's individualized plan of care/goals and shares the quality data associated with the providers was provided to: Patient   Patient Representative Name:       The Patient and/or Patient Representative Agree with the Discharge Plan?  Yes    Cherry Knox RN  Case Management Department  Ph: [phone] Fax: [phone]

## 2023-01-17 NOTE — H&P
[facility]  Office: [address], DO, [facility], [place] Gustavowes, DO, Patrick Mcleod, DO, Ayde Dowd, DO, Dagmar White MD, Hellen Sumner MD, Maria T Simmons MD, Maile Norman MD,  Sandra Woods MD, Richard Hill MD, Shaneka Boothe, DO, Kaden Farmer MD,  Brittaney Sommers MD, Dilcia Macdonald MD, Maureen Russo DO, Iris Moreno MD, Ariel Maya MD, Aiyana Hernandez DO, Jessy Edwards MD, Serenity Huston MD, Lorie Bazan MD, Levon Crews MD, Diane Soliz DO, Delfina Benavidez MD, sIra López MD, Noah Jordan, CNP,  Yonathan Obrien, CNP, Arlen Bates, CNP, Sheri Olivarez, CNP,  Nora Mcmahon, [facility], Ese Benz, CNP, Michael Ritter CNP, Keven Stone, CNP, Layla Roque, CNP, Karina Lang, CNP, Muna Lomeli PAJanineC, Lucille Mcclellan, CNS, Belinda Hall, CNP, Priyank Gibson, [address]    HISTORY AND PHYSICAL EXAMINATION            Date:   1/17/2023  Patient name:  Arpan Zapata  Date of admission:  1/16/2023  9:52 PM  MRN:   [id number]  Account:  [de-identified]  YOB: 1925  PCP:    Lili Mcdonald MD  Room:   [address] [place] [address]  Code Status:    DNR-CC    Chief Complaint:     Chief Complaint   Patient presents with    Fall    Hip Pain    Head Injury     Patient seen in follow-up for fall, patient states \"I am sore\"    History Obtained From:     patient, electronic medical record    History of Present Illness:     Arpan Zapata is a 80 y.o. Non- / non  female who presents with Fall, Hip Pain, and Head Injury   and is admitted to the hospital for the management of Recurrent falls. This very pleasant 55-year-old female who suffers from dementia and was brought to the hospital after a fall. She has had multiple falls and did sustain an acute fracture of the sacrum and L5 transverse process on the left.   She has multiple age-indeterminate compression fractures and multiple vertebroplasty's have been noted. This is 1 of many falls and she is weightbearing as tolerated. She was brought from an extended care facility. She was evaluated in the emergency room and family are requesting placement to a memory care unit. At this point in time the patient feels reasonably well. She has pain however it is under reasonable control with Norco.  She can be weightbearing as tolerated and will titrate her pain control as needed. The patient denies any questions or concerns. She is pleasantly confused. Past Medical History:     Past Medical History:   Diagnosis Date    Atrial fibrillation ([address]     Cognitive communication deficit     Compression fracture of L2 ([address]     Compression fracture of thoracic vertebra (HCC)     Dementia [address]     Encephalopathy     Fall     Hypertension     Muscle weakness     Osteoarthritis     Pneumonia     Rhabdomyolysis     Rib fractures     Sinusitis     Spinal stenosis         Past Surgical History:     Past Surgical History:   Procedure Laterality Date    KYPHOSIS SURGERY          Medications Prior to Admission:     Prior to Admission medications    Medication Sig Start Date End Date Taking? Authorizing Provider   acetaminophen (TYLENOL) 325 MG tablet Take by mouth every 6 hours as needed for Pain   Yes Historical Provider, MD   apixaban (ELIQUIS) 2.5 MG TABS tablet Take 2.5 mg by mouth 2 times daily   Yes Historical Provider, MD   cetirizine (ZYRTEC) 10 MG tablet Take 10 mg by mouth daily   Yes Historical Provider, MD   docusate sodium (COLACE) 100 MG capsule Take 100 mg by mouth 2 times daily as needed for Constipation   Yes Historical Provider, MD   HYDROcodone-acetaminophen (NORCO) 5-325 MG per tablet Take 1 tablet by mouth every 6 hours as needed for Pain.    Yes Historical Provider, MD   metoprolol succinate (TOPROL XL) 25 MG extended release tablet Take 25 mg by mouth daily   Yes Historical Provider, MD   triamterene-hydroCHLOROthiazide Norvel Liter) 37.5-25 MG per tablet Take 1 tablet by mouth daily In the afternoon   Yes Historical Provider, MD        Allergies:     Aspirin, Penicillins, and Tetracyclines & related    Social History:     Tobacco:    reports that she has never smoked. She has never used smokeless tobacco.  Alcohol:      reports no history of alcohol use.  Drug Use:  reports no history of drug use.    Family History:     History reviewed. No pertinent family history.    Review of Systems:     Positive and Negative as described in HPI.    CONSTITUTIONAL:  negative for fevers, chills, sweats, fatigue, weight loss  HEENT:  negative for vision, hearing changes, runny nose, throat pain  RESPIRATORY:  negative for shortness of breath, cough, congestion, wheezing  CARDIOVASCULAR:  negative for chest pain, palpitations  GASTROINTESTINAL:  negative for nausea, vomiting, diarrhea, constipation, change in bowel habits, abdominal pain   GENITOURINARY:  negative for difficulty of urination, burning with urination, frequency   INTEGUMENT:  negative for rash, skin lesions, easy bruising   HEMATOLOGIC/LYMPHATIC:  negative for swelling/edema   ALLERGIC/IMMUNOLOGIC:  negative for urticaria , itching  ENDOCRINE:  negative increase in drinking, increase in urination, hot or cold intolerance  MUSCULOSKELETAL: Patient is aware of pain associated with her fall  NEUROLOGICAL:  negative for headaches, dizziness, lightheadedness, numbness, pain, tingling extremities  BEHAVIOR/PSYCH:  negative for depression, anxiety    Physical Exam:   BP (!) 144/89   Pulse 92   Temp 98.1 °F (36.7 °C)   Resp 16   SpO2 97%   Temp (24hrs), Av °F (36.7 °C), Min:97.9 °F (36.6 °C), Max:98.1 °F (36.7 °C)    No results for input(s): POCGLU in the last 72 hours.    Intake/Output Summary (Last 24 hours) at 2023 1308  Last data filed at 2023 0448  Gross per 24 hour   Intake --   Output 350 ml   Net -350 ml       General Appearance:  alert, well appearing, and in no acute  distress  Mental status: Oriented only to self  Head:  normocephalic, atraumatic  Eye: no icterus, redness, pupils equal and reactive, extraocular eye movements intact, conjunctiva clear  Ear: normal external ear, no discharge, hearing intact  Nose:  no drainage noted  Mouth: mucous membranes moist  Neck: supple, no carotid bruits, thyroid not palpable  Lungs: Bilateral equal air entry, clear to ausculation, no wheezing, rales or rhonchi, normal effort  Cardiovascular: normal rate, regular rhythm, no murmur, gallop, rub  Abdomen: Soft, nontender, nondistended, normal bowel sounds, no hepatomegaly or splenomegaly  Neurologic: There are no new focal motor or sensory deficits, normal muscle tone and bulk, no abnormal sensation, normal speech, cranial nerves II through XII grossly intact  Skin: No gross lesions, rashes, bruising or bleeding on exposed skin area  Extremities:  peripheral pulses palpable, no pedal edema or calf pain with palpation  Psych: normal affect     Investigations:      Laboratory Testing:  Recent Results (from the past 24 hour(s))   Basic Metabolic Panel w/ Reflex to MG    Collection Time: 01/17/23  5:52 AM   Result Value Ref Range    Glucose 160 (H) 70 - 99 mg/dL    BUN 17 8 - 23 mg/dL    Creatinine 0.85 0.50 - 0.90 mg/dL    Est, Glom Filt Rate >60 >60 mL/min/1.73m2    Calcium 9.2 8.6 - 10.4 mg/dL    Sodium 132 (L) 135 - 144 mmol/L    Potassium 3.9 3.7 - 5.3 mmol/L    Chloride 96 (L) 98 - 107 mmol/L    CO2 25 20 - 31 mmol/L    Anion Gap 11 9 - 17 mmol/L   Protime-INR    Collection Time: 01/17/23  5:52 AM   Result Value Ref Range    Protime 10.7 9.4 - 12.6 sec    INR 1.0    CBC    Collection Time: 01/17/23  5:52 AM   Result Value Ref Range    WBC 8.2 3.5 - 11.0 k/uL    RBC 2.89 (L) 4.0 - 5.2 m/uL    Hemoglobin 8.7 (L) 12.0 - 16.0 g/dL    Hematocrit 27.1 (L) 36 - 46 %    MCV 93.8 80 - 100 fL    MCH 30.1 26 - 34 pg    MCHC 32.1 31 - 37 g/dL    RDW 15.5 (H) 12.5 - 15.4 %    Platelets 628 543 - 216 k/uL    MPV 8.4 8.0 - 14.0 fL       Imaging/Diagnostics:  CT Head W/O Contrast    Result Date: 1/16/2023  No acute intracranial abnormality. Generalized atrophy and chronic small vessel ischemic white matter disease.     CT CSpine W/O Contrast    Result Date: 1/16/2023  No acute fracture or traumatic malalignment of the cervical spine.     CT THORACIC SPINE WO CONTRAST    Result Date: 1/16/2023  Multiple compression fractures and vertebroplasties age indeterminate. Multiple pulmonary nodules right lower lobe.  Recommend follow-up CT chest with IV contrast non emergently.  Differential considerations include infectious, inflammatory, and neoplastic etiologies. Sensitivity and specificity limited due to demineralization.     CT LUMBAR SPINE WO CONTRAST    Result Date: 1/17/2023  Acute fracture of the sacrum and L5 transverse process on the left.  Multiple compression fractures, age indeterminate.  Multiple vertebroplasties and postsurgical changes as above.  Demineralization decreases sensitivity and specificity.  MRI recommended for further characterization.     XR HIP 2-3 VW W PELVIS LEFT    Result Date: 1/16/2023  Possible new fractures superior inferior pubic rami on the left.  Right superior and inferior pubic rami fractures unchanged.  Sensitivity limited due to demineralization.  Postop changes as above.       Assessment :      Hospital Problems             Last Modified POA    * (Principal) Recurrent falls 1/17/2023 Yes       Plan:     Patient status inpatient in the Med/Surge    Fall with sacral/L5 transverse process fracture  Pain control, weightbearing as tolerated  Dementia  Supportive care  Anemia  Etiology unknown, would not aggressively work-up at 89 years of age  Essential hypertension  Home medications resumed, may need to hold thiazide diuretic if patient has worsening of hyponatremia  Mild hyponatremia  Check morning labs, possibly secondary to thiazide diuretic, poor solute intake, SIADH  History  of DVT/PE  Will continue Eliquis for now however risk versus benefit of anticoagulation long term will have to be assessed given her recurrent falls    Consultations:   IP CONSULT TO SOCIAL WORK  IP CONSULT TO SPIRITUAL SERVICES    Patient is admitted as inpatient status because of co-morbidities listed above, severity of signs and symptoms as outlined, requirement for current medical therapies and most importantly because of direct risk to patient if care not provided in a hospital setting.  Expected length of stay > 48 hours.    Oliverio Stiles DO  1/17/2023  1:08 PM    Copy sent to Dr. Florencio Larry MD

## 2023-01-17 NOTE — ED NOTES
Call made to pt hermila Isidro to notifiy of pt  visit to ED.     Florina Wiggins RN  01/16/23 0298

## 2023-01-17 NOTE — ED PROVIDER NOTES
[facility] Emergency Department  [address]  [facility] [address]  Phone: [phone]  Fax: [phone]      Pt Name: Swati Ly  MRN:[id number]  Birthdate 8/19/1925  Date of evaluation: 1/16/2023      CHIEF COMPLAINT       Chief Complaint   Patient presents with    Fall    Hip Pain    Head Injury       HISTORY OF PRESENT ILLNESS   Swati Ly is a 89 y.o. female who presents for evaluation after a fall.  The patient has history of dementia and is a poor historian.  Based on chart review she has had multiple falls and this has increased recently.  She is currently at [facility] for rehab after a fall that resulted in right superior and inferior pubic rami fractures, thoracic and lumbar compression fractures, and bilateral rib fractures on 12/15/2022.  The patient reports that tonight she was attempting to walk to the bathroom on her own when she lost her balance and fell onto her left side.  She states that she struck the back of her head and landed on her left hip.  She was able to call for help and staff immediately came to her side.  The patient denies loss of consciousness.  Based on chart review she is on Eliquis for paroxysmal A. fib.  The patient complains of generalized pain with increased pain to the back of her head and to her left hip.  She states her pain is worse with movement.  She has not taken any medications for pain and does not list any other provoking or palliating factors.  Based on her paperwork she is taking Norco at home.  The patient is a DNR CC.  She arrives by EMS and has refused IV or IM medications.  She denies fever, chills, neck pain, vision changes, chest pain, shortness of breath, abdominal pain, urinary/bowel symptoms, focal weakness, numbness, tingling, recent illness.    REVIEW OF SYSTEMS     Positive: Headache, back pain, left hip pain  Ten point review of systems was reviewed and is negative unless otherwise noted in the HPI    PAST  MEDICAL HISTORY    has a past medical history of Atrial fibrillation ([address] Cognitive communication deficit, Compression fracture of L2 ([address] Compression fracture of thoracic vertebra ([address] Dementia ([address] Encephalopathy, Fall, Hypertension, Muscle weakness, Osteoarthritis, Pneumonia, Rhabdomyolysis, Rib fractures, Sinusitis, and Spinal stenosis. SURGICAL HISTORY      has a past surgical history that includes Kyphosis surgery. CURRENT MEDICATIONS       Previous Medications    ACETAMINOPHEN (TYLENOL) 325 MG TABLET    Take by mouth every 6 hours as needed for Pain    APIXABAN (ELIQUIS) 2.5 MG TABS TABLET    Take 2.5 mg by mouth 2 times daily    CETIRIZINE (ZYRTEC) 10 MG TABLET    Take 10 mg by mouth daily    DOCUSATE SODIUM (COLACE) 100 MG CAPSULE    Take 100 mg by mouth 2 times daily as needed for Constipation    HYDROCODONE-ACETAMINOPHEN (NORCO) 5-325 MG PER TABLET    Take 1 tablet by mouth every 6 hours as needed for Pain. METOPROLOL SUCCINATE (TOPROL XL) 25 MG EXTENDED RELEASE TABLET    Take 25 mg by mouth daily    TRIAMTERENE-HYDROCHLOROTHIAZIDE (MAXZIDE-25) 37.5-25 MG PER TABLET    Take 1 tablet by mouth daily In the afternoon       ALLERGIES     is allergic to aspirin, penicillins, and tetracyclines & related. FAMILY HISTORY     has no family status information on file. family history is not on file. SOCIAL HISTORY      reports that she has never smoked. She has never used smokeless tobacco. She reports that she does not drink alcohol and does not use drugs. PHYSICAL EXAM     INITIAL VITALS:  oral temperature is 97.9 °F (36.6 °C). Her blood pressure is 130/92 (abnormal) and her pulse is 89. Her respiration is 14 and oxygen saturation is 98%.      CONSTITUTIONAL: Appears uncomfortable, nontoxic, refusing any IV initially  SKIN: warm, dry, no jaundice, hives or petechiae  EYES: clear conjunctiva, non-icteric sclera, pupils 3 mm, equal, round reactive to light, extraocular movements intact  HENT: normocephalic, atraumatic, moist mucus membranes. No hemotympanum, gonzalez sign, raccoon eyes or septal hematoma. NECK: Nontender and supple with no nuchal rigidity, full range of motion  PULMONARY: clear to auscultation without wheezes, rhonchi, or rales, normal excursion, no accessory muscle use and no stridor  CARDIOVASCULAR: regular rate, rhythm. Strong radial pulses with intact distal perfusion. Capillary refill <2 seconds. GASTROINTESTINAL: soft, non-tender, non-distended, no palpable masses, no rebound or guarding. Pelvis stable. GENITOURINARY: No costovertebral angle tenderness to palpation  MUSCULOSKELETAL: There is left lumbar paraspinal muscle tenderness to palpation and pain over the posterior sacrum. No midline spinal tenderness, step off or deformity. Extremities are otherwise nontender to palpation and nonerythematous. Compartments soft. No peripheral edema. NEUROLOGIC: alert and oriented x self and place but not time, GCS 15, poor historian, normal speech. Moves all extremities x 4 without motor or sensory deficit. Normal perirectal tone and sensation. Downgoing Babinski's. Normal proprioception. Normal DTRs. Strength 5/5 with dorsi and plantar flexion of the bilateral feet. Able to lift both legs off the bed. PSYCHIATRIC: Dementia    DIAGNOSTIC RESULTS     EKG:  None    RADIOLOGY:   CT Head W/O Contrast    Result Date: 1/16/2023  EXAMINATION: CT OF THE HEAD WITHOUT CONTRAST  1/16/2023 10:56 pm TECHNIQUE: CT of the head was performed without the administration of intravenous contrast. Automated exposure control, iterative reconstruction, and/or weight based adjustment of the mA/kV was utilized to reduce the radiation dose to as low as reasonably achievable. COMPARISON: None.  HISTORY: ORDERING SYSTEM PROVIDED HISTORY: fall, struck back of head TECHNOLOGIST PROVIDED HISTORY: fall, struck back of head Decision Support Exception - unselect if not a suspected or confirmed emergency medical condition->Emergency Medical Condition (MA) Reason for Exam: fall, struck back of head FINDINGS: BRAIN/VENTRICLES: There is generalized atrophy and there is patchy periventricular and subcortical white matter low attenuation that is nonspecific but most consistent with chronic small vessel ischemia. There is no acute intracranial hemorrhage, mass effect or midline shift. No abnormal extra-axial fluid collection. The gray-white differentiation is maintained without evidence of an acute infarct. ORBITS: The visualized portion of the orbits demonstrate no acute abnormality. SINUSES: The visualized paranasal sinuses and mastoid air cells demonstrate no acute abnormality. SOFT TISSUES/SKULL:  No acute abnormality of the visualized skull or soft tissues. No acute intracranial abnormality. Generalized atrophy and chronic small vessel ischemic white matter disease. CT CSpine W/O Contrast    Result Date: 1/16/2023  EXAMINATION: CT OF THE CERVICAL SPINE WITHOUT CONTRAST 1/16/2023 10:57 pm TECHNIQUE: CT of the cervical spine was performed without the administration of intravenous contrast. Multiplanar reformatted images are provided for review. Automated exposure control, iterative reconstruction, and/or weight based adjustment of the mA/kV was utilized to reduce the radiation dose to as low as reasonably achievable. COMPARISON: None. HISTORY: ORDERING SYSTEM PROVIDED HISTORY: fall, struck back of head, h/o dementia TECHNOLOGIST PROVIDED HISTORY: fall, struck back of head, h/o dementia Decision Support Exception - unselect if not a suspected or confirmed emergency medical condition->Emergency Medical Condition (MA) Reason for Exam: fall, struck back of head, h/o dementia FINDINGS: BONES/ALIGNMENT: There is a minimal degenerative retrolisthesis C5 on C6. Vertebral body alignment is otherwise unremarkable.   Cervical vertebral body heights are normal.  There is no acute fracture or traumatic malalignment. Remote vertebral augmentation at T6 incompletely visualized. DEGENERATIVE CHANGES: There is moderate degenerative disc disease at C5-C6 with disc space narrowing, discogenic sclerosis and spondylosis. There is multilevel facet arthropathy, right greater than left. SOFT TISSUES: There is no prevertebral soft tissue swelling. No acute fracture or traumatic malalignment of the cervical spine. CT THORACIC SPINE WO CONTRAST    Result Date: 1/16/2023  EXAMINATION: CT OF THE THORACIC SPINE WITHOUT CONTRAST  1/16/2023 10:57 pm: TECHNIQUE: CT of the thoracic spine was performed without the administration of intravenous contrast. Multiplanar reformatted images are provided for review. Automated exposure control, iterative reconstruction, and/or weight based adjustment of the mA/kV was utilized to reduce the radiation dose to as low as reasonably achievable. COMPARISON: None. HISTORY: ORDERING SYSTEM PROVIDED HISTORY: fall, back pain, h/o compression fractures TECHNOLOGIST PROVIDED HISTORY: fall, back pain, h/o compression fractures Reason for Exam: fall, back pain, h/o compression fractures FINDINGS: BONES/ALIGNMENT: There is normal alignment of the spine except for moderate kyphosis except for compression fractures at T6 T11, T12, L1 and L2. Vertebroplasties are noted at T6 T11, L1 and L2. No significant retropulsion. Diffuse demineralization decreases sensitivity. .  The vertebral body heights are maintained. No osseous destructive lesion is seen. DEGENERATIVE CHANGES: No gross spinal canal stenosis or bony neural foraminal narrowing of the thoracic spine. SOFT TISSUES: No paraspinal mass is seen. Multiple pulmonary nodules right lower lobe totaling at least 4 measuring up to 6 mm. Multiple compression fractures and vertebroplasties age indeterminate. Multiple pulmonary nodules right lower lobe. Recommend follow-up CT chest with IV contrast non emergently.   Differential considerations include infectious, inflammatory, and neoplastic etiologies. Sensitivity and specificity limited due to demineralization. CT LUMBAR SPINE WO CONTRAST    Result Date: 1/17/2023  EXAMINATION: CT OF THE LUMBAR SPINE WITHOUT CONTRAST  1/16/2023 TECHNIQUE: CT of the lumbar spine was performed without the administration of intravenous contrast. Multiplanar reformatted images are provided for review. Adjustment of mA and/or kV according to patient size was utilized. Automated exposure control, iterative reconstruction, and/or weight based adjustment of the mA/kV was utilized to reduce the radiation dose to as low as reasonably achievable. COMPARISON: None HISTORY: ORDERING SYSTEM PROVIDED HISTORY: fall, back pain, h/o compression fractures TECHNOLOGIST PROVIDED HISTORY: fall, back pain, h/o compression fractures Decision Support Exception - unselect if not a suspected or confirmed emergency medical condition->Emergency Medical Condition (MA) Reason for Exam: fall, back pain, h/o compression fractures FINDINGS: BONES/ALIGNMENT: Diffuse demineralization decreases sensitivity. Mild scoliosis. Nondisplaced fracture of the left sacral ala and possibly the right. There is also an avulsion fracture of the transverse process of L5 on the left. Compression fractures noted at T11 through L4. Mild retropulsion T12 otherwise no significant retropulsion. Vertebroplasties noted at T11, L1 and L2. DEGENERATIVE CHANGES: Spondylosis. Vacuum disc phenomena at L5-S1 in particular. Slight anterior subluxation L5-S1. Posterior inter spinous hardware L4-5. trefoil type spinal stenosis at L2-3 and L3-4. SOFT TISSUES/RETROPERITONEUM: No paraspinal mass is seen. Acute fracture of the sacrum and L5 transverse process on the left. Multiple compression fractures, age indeterminate. Multiple vertebroplasties and postsurgical changes as above. Demineralization decreases sensitivity and specificity.   MRI recommended for further characterization. XR HIP 2-3 VW W PELVIS LEFT    Result Date: 1/16/2023  EXAMINATION: ONE XRAY VIEW OF THE PELVIS AND TWO XRAY VIEWS LEFT HIP 1/16/2023 11:00 pm COMPARISON: December 30, 2022 HISTORY: ORDERING SYSTEM PROVIDED HISTORY: fall, left hip pain TECHNOLOGIST PROVIDED HISTORY: fall, left hip pain Reason for Exam: fall, left hip pain FINDINGS: Fractures superior inferior pubic rami on the right unchanged. Possible new superior and inferior pubic rami fractures on the left. Diffuse demineralization decreases sensitivity and specificity. Hardware right hip. Hips are in anatomic alignment. Sacroiliac joints appear normal. Degenerative changes lumbar spine. Hardware lumbar spine. Dedicated images left hip negative for fracture otherwise. Possible new fractures superior inferior pubic rami on the left. Right superior and inferior pubic rami fractures unchanged. Sensitivity limited due to demineralization. Postop changes as above. XR HIP 2-3 VW W PELVIS RIGHT    Result Date: 12/30/2022  EXAMINATION: ONE XRAY VIEW OF THE PELVIS AND TWO XRAY VIEWS RIGHT HIP 12/30/2022 10:58 pm COMPARISON: None. HISTORY: ORDERING SYSTEM PROVIDED HISTORY: fall TECHNOLOGIST PROVIDED HISTORY: fall Reason for Exam: hip pain FINDINGS: Mildly displaced right superior and inferior pubic ramus fractures, age indeterminate. Mildly displaced right pubis fracture, age indeterminate. Low bone density. Right femoral neck nail, prior plate and screw fixation of proximal right femur without signs of hardware failure. Degenerative changes of the pubis, lower lumbar spine. Mildly displaced right superior and inferior pubic ramus fractures, age indeterminate. Mildly displaced right pubis fracture, age indeterminate. Low bone density. Right femoral neck nail, prior plate and screw fixation of proximal right femur without signs of hardware failure. LABS:  No results found for this visit on 01/16/23.     EMERGENCY DEPARTMENT COURSE:        The patient was given the following medications:  Orders Placed This Encounter   Medications    oxyCODONE-acetaminophen (PERCOCET) 5-325 MG per tablet 1 tablet    DISCONTD: morphine injection 4 mg    apixaban (ELIQUIS) tablet 2.5 mg     Order Specific Question:   Indication of Use     Answer:   History of DVT/PE (indefinite)    HYDROcodone-acetaminophen (NORCO) 5-325 MG per tablet 1 tablet        Vitals:    Vitals:    01/16/23 2233 01/17/23 0135 01/17/23 0547   BP: (!) 158/77 (!) 166/106 (!) 130/92   Pulse: 94 92 89   Resp: 18 20 14   Temp: 98.1 °F (36.7 °C)  97.9 °F (36.6 °C)   TempSrc: Oral  Oral   SpO2: 96%  98%     -------------------------  BP: (!) 130/92, Temp: 97.9 °F (36.6 °C), Heart Rate: 89, Resp: 14    CONSULTS:  IP CONSULT TO SOCIAL WORK    CRITICAL CARE:   None    PROCEDURES:  None    DIAGNOSIS/ MDM:   Gennaro Riggins is a 80 y.o. female who presents with headache and left hip pain after a fall. She has history of dementia and is a DNR CC. No signs of basilar skull fracture on exam.  She is neurovascularly intact. X-ray of the left hip shows possible new fractures along the superior inferior pubic rami on the left. There are unchanged right superior and inferior pubic rami on the right. CT of the lumbar spine shows an acute fracture of the sacrum and L5 transverse process on the left. There are multiple compression fractures that are age-indeterminate. She has multiple vertebroplasty's and postsurgical changes. She has multiple pulmonary nodules in the right lower lobe. CT cervical spine and CT head showed no acute process. The patient continues to refuse a IV for me. We did place a pure wick to reduce movement and pain. She was agreeable to a Percocet which did help with her pain. I spoke to her son, Otto Kellogg, who is her POA. He recommends pain control at this time and does not want any advanced testing.   He states that the patient has had multiple falls despite increasing the number of aides that are with her. He states that he and his family have talked about trying to place her in a long-term memory care unit. He would like to have her admitted and speak with social work tomorrow for placement. FINAL IMPRESSION      1. Closed head injury, initial encounter    2.  Closed fracture of superior ramus of left pubis, initial encounter ([address]    3. Closed fracture of left inferior pubic ramus, initial encounter ([address]    4. Closed fracture of transverse process of lumbar vertebra, initial encounter ([address]    5. Other fracture of sacrum, initial encounter for closed fracture ([address]    6. Pulmonary nodule          DISPOSITION/PLAN   DISPOSITION Admitted 01/17/2023 12:28:20 AM              (Please note that portions of this note were completed with a voice recognitionprogram.  Efforts were made to edit the dictations but occasionally words are mis-transcribed.)    Blane Corona DO, [facility]  Emergency Physician Attending          Blane Corona DO  01/17/23 3476

## 2023-01-17 NOTE — PROGRESS NOTES
[facility]  PROGRESS NOTE    Shift date: 1/17/23  Shift day: Tuesday   Shift # 1    Room # 671/760-35   Name: Luis Bland                Shinto: Yazidi  Referral:  MANDO Farr Date & Time: 1/16/2023  9:52 PM    Assessment:  Luis Bland is a 80 y.o. female in the hospital. Writer learned from the nurse that Pt's daughter had requested a visit from the . Upon entering the room writer observes Patient was lying in bed. Patient's daughter, Petar Martin, was sitting on the couch. Pt opened her eyes and greeted writer. Pt at first did not understand writer's role. Daughter, who greeted writer, clarified. Pt was somewhat disoriented and conversed with disjointed phrases. Daughter asked if Pt would like writer to offer a prayer. Pt voiced her requests, and Daughter voiced her requests. Daughter was tearful as writer offered a prayer. Pt and Daughter joined writer in reciting the [employer]. Daughter noted that Pt's strength is her perseverance. Daughter laughed as she acknowledged this strength. Intervention:  Writer introduced self and title as . Writer inquired how Pt and Daughter were doing. Writer offered a prayer and words of support and encouragement. Writer affirmed Pt's strengths. Outcome:  Daughter and Pt thanked writer for the visit. Plan:  Chaplains will remain available to offer spiritual and emotional support as needed. 01/17/23 1637   Encounter Summary   Service Provided For: Patient and family together   Referral/Consult From: Nurse   Support System Children;Family members   Last Encounter  01/17/23   Complexity of Encounter Moderate   Begin Time 1600   End Time  1610   Total Time Calculated 10 min   Encounter    Type Initial Screen/Assessment   Spiritual/Emotional needs   Type Spiritual Support   Assessment/Intervention/Outcome   Assessment Calm;Questioning cee; Impaired resilience; Tearful;Sad   Intervention Sustaining Presence/Ministry of presence; Active listening;Explored/Affirmed feelings, thoughts, concerns;Explored Coping Skills/Resources;Prayer (assurance of)/[place]   Outcome Expressed feelings, needs, and concerns;Expressed Gratitude   Plan and Referrals   Plan/Referrals No future visits requested       Electronically signed by Amelia Braga on 1/17/2023 at 4:41 PM.  [facility]  [phone]

## 2023-01-17 NOTE — PLAN OF CARE
Problem: Discharge Planning  Goal: Discharge to home or other facility with appropriate resources  Outcome: Progressing  Flowsheets (Taken 1/17/2023 0815)  Discharge to home or other facility with appropriate resources:   Identify barriers to discharge with patient and caregiver   Arrange for needed discharge resources and transportation as appropriate   Identify discharge learning needs (meds, wound care, etc)   Refer to discharge planning if patient needs post-hospital services based on physician order or complex needs related to functional status, cognitive ability or social support system     Problem: Pain  Goal: Verbalizes/displays adequate comfort level or baseline comfort level  Outcome: Progressing   - Medicated for pain as ordered, as needed.

## 2023-01-17 NOTE — ED NOTES
[place] updated on waiting for results of imaging and poc.  Will re-update with further results     Carolin Wilson RN  01/16/23 3753

## 2023-01-18 VITALS
HEART RATE: 85 BPM | SYSTOLIC BLOOD PRESSURE: 108 MMHG | BODY MASS INDEX: 22.43 KG/M2 | DIASTOLIC BLOOD PRESSURE: 71 MMHG | WEIGHT: 114.86 LBS | TEMPERATURE: 98.4 F | OXYGEN SATURATION: 99 % | RESPIRATION RATE: 16 BRPM

## 2023-01-18 LAB
ANION GAP SERPL CALCULATED.3IONS-SCNC: 8 MMOL/L (ref 9–17)
BUN BLDV-MCNC: 18 MG/DL (ref 8–23)
CALCIUM SERPL-MCNC: 8.8 MG/DL (ref 8.6–10.4)
CHLORIDE BLD-SCNC: 95 MMOL/L (ref 98–107)
CO2: 26 MMOL/L (ref 20–31)
CREAT SERPL-MCNC: 0.94 MG/DL (ref 0.5–0.9)
GFR SERPL CREATININE-BSD FRML MDRD: 55 ML/MIN/1.73M2
GLUCOSE BLD-MCNC: 144 MG/DL (ref 70–99)
HCT VFR BLD CALC: 27.1 % (ref 36–46)
HEMOGLOBIN: 8.5 G/DL (ref 12–16)
MCH RBC QN AUTO: 30.1 PG (ref 26–34)
MCHC RBC AUTO-ENTMCNC: 31.4 G/DL (ref 31–37)
MCV RBC AUTO: 96.1 FL (ref 80–100)
PDW BLD-RTO: 15.8 % (ref 12.5–15.4)
PLATELET # BLD: 350 K/UL (ref 140–450)
PMV BLD AUTO: 8.8 FL (ref 8–14)
POTASSIUM SERPL-SCNC: 3.9 MMOL/L (ref 3.7–5.3)
RBC # BLD: 2.82 M/UL (ref 4–5.2)
SODIUM BLD-SCNC: 129 MMOL/L (ref 135–144)
WBC # BLD: 7.4 K/UL (ref 3.5–11)

## 2023-01-18 PROCEDURE — 36415 COLL VENOUS BLD VENIPUNCTURE: CPT

## 2023-01-18 PROCEDURE — 80048 BASIC METABOLIC PNL TOTAL CA: CPT

## 2023-01-18 PROCEDURE — 6370000000 HC RX 637 (ALT 250 FOR IP): Performed by: NURSE PRACTITIONER

## 2023-01-18 PROCEDURE — 2580000003 HC RX 258: Performed by: NURSE PRACTITIONER

## 2023-01-18 PROCEDURE — 85027 COMPLETE CBC AUTOMATED: CPT

## 2023-01-18 PROCEDURE — 6360000002 HC RX W HCPCS: Performed by: HOSPITALIST

## 2023-01-18 RX ORDER — ENOXAPARIN SODIUM 100 MG/ML
30 INJECTION SUBCUTANEOUS DAILY
Status: DISCONTINUED | OUTPATIENT
Start: 2023-01-19 | End: 2023-01-18 | Stop reason: HOSPADM

## 2023-01-18 RX ORDER — MORPHINE SULFATE 2 MG/ML
2 INJECTION, SOLUTION INTRAMUSCULAR; INTRAVENOUS
Status: DISCONTINUED | OUTPATIENT
Start: 2023-01-18 | End: 2023-01-18 | Stop reason: HOSPADM

## 2023-01-18 RX ORDER — HYDROCODONE BITARTRATE AND ACETAMINOPHEN 5; 325 MG/1; MG/1
1 TABLET ORAL EVERY 6 HOURS PRN
Qty: 10 TABLET | Refills: 0 | Status: SHIPPED | OUTPATIENT
Start: 2023-01-18 | End: 2023-01-25

## 2023-01-18 RX ADMIN — APIXABAN 2.5 MG: 2.5 TABLET, FILM COATED ORAL at 09:30

## 2023-01-18 RX ADMIN — HYDROCODONE BITARTRATE AND ACETAMINOPHEN 1 TABLET: 5; 325 TABLET ORAL at 12:56

## 2023-01-18 RX ADMIN — SODIUM CHLORIDE, PRESERVATIVE FREE 10 ML: 5 INJECTION INTRAVENOUS at 10:28

## 2023-01-18 RX ADMIN — METOPROLOL SUCCINATE 25 MG: 25 TABLET, EXTENDED RELEASE ORAL at 09:30

## 2023-01-18 RX ADMIN — MORPHINE SULFATE 2 MG: 2 INJECTION, SOLUTION INTRAMUSCULAR; INTRAVENOUS at 19:46

## 2023-01-18 RX ADMIN — HYDROCODONE BITARTRATE AND ACETAMINOPHEN 1 TABLET: 5; 325 TABLET ORAL at 04:00

## 2023-01-18 ASSESSMENT — PAIN SCALES - GENERAL
PAINLEVEL_OUTOF10: 6
PAINLEVEL_OUTOF10: 0
PAINLEVEL_OUTOF10: 6
PAINLEVEL_OUTOF10: 6

## 2023-01-18 ASSESSMENT — PAIN DESCRIPTION - LOCATION
LOCATION: GENERALIZED
LOCATION: BACK

## 2023-01-18 ASSESSMENT — PAIN DESCRIPTION - DESCRIPTORS
DESCRIPTORS: ACHING
DESCRIPTORS: ACHING

## 2023-01-18 NOTE — PLAN OF CARE
Problem: Discharge Planning  Goal: Discharge to home or other facility with appropriate resources  1/18/2023 0116 by Lorenzo Yañze RN  Outcome: Progressing     Problem: Pain  Goal: Verbalizes/displays adequate comfort level or baseline comfort level  1/18/2023 0116 by Lorenzo Yañez RN  Outcome: Progressing   PRN pain medication ordered (See Mar). Problem: Skin/Tissue Integrity  Goal: Absence of new skin breakdown  Description: 1. Monitor for areas of redness and/or skin breakdown  2. Assess vascular access sites hourly  3. Every 4-6 hours minimum:  Change oxygen saturation probe site  4. Every 4-6 hours:  If on nasal continuous positive airway pressure, respiratory therapy assess nares and determine need for appliance change or resting period. Outcome: Progressing     Problem: Safety - Adult  Goal: Free from fall injury  Outcome: Progressing   Fall assessment performed and appropriate measures implemented. Room freed from clutter. Bed in lowest position with wheels locked. Call light in place. ID band in place.     Problem: ABCDS Injury Assessment  Goal: Absence of physical injury  Outcome: Progressing

## 2023-01-18 NOTE — PROGRESS NOTES
Physical Therapy        Physical Therapy Cancel Note      DATE: 2023    NAME: Georgina Huntley  MRN: [id number]   : 1925      Patient not seen this date for Physical Therapy due to:     Other: patient transferring to hospice later today per caregiver      Electronically signed by Eulogio Angulo PT on 2023 at 3:03 PM

## 2023-01-18 NOTE — PROGRESS NOTES
Occupational [address]  Occupational Therapy Not Seen Note    DATE: 2023    NAME: Ranjana Cronin  MRN: [id number]   : 1925      Patient not seen this date for Occupational Therapy due to:    Patient is not appropriate for active participation in OT d/t:  Pt leaving with Hospice Services.         Electronically signed by MARLEY West OTR/L on 2023 at 4:48 PM

## 2023-01-18 NOTE — DISCHARGE INSTR - COC
Continuity of Care Form    Patient Name: Katy Conti   :  1925  MRN:  [id number]    Admit date:  2023  Discharge date:  ***    Code Status Order: DNR-CC   Advance Directives:     Admitting Physician:  Uma Guerrero MD  PCP: Jodi Kowalski MD    Discharging Nurse: [facility] Unit/Room#: 687/280-25  Discharging Unit Phone Number: ***    Emergency Contact:   Extended Emergency Contact Information  Primary Emergency Contact: Sanna Ly  [place] Phone: [phone]  Relation: Child  Secondary Emergency Contact: [facility] Phone: [phone]  Relation: Child    Past Surgical History:  Past Surgical History:   Procedure Laterality Date    KYPHOSIS SURGERY         Immunization History: There is no immunization history on file for this patient. Active Problems:  Patient Active Problem List   Diagnosis Code    Recurrent falls R29.6       Isolation/Infection:   Isolation            No Isolation          Patient Infection Status       None to display            Nurse Assessment:  Last Vital Signs: /71   Pulse 85   Temp 98.4 °F (36.9 °C) (Oral)   Resp 16   Wt 114 lb 13.8 oz (52.1 kg)   SpO2 99%   BMI 22.43 kg/m²     Last documented pain score (0-10 scale): Pain Level: 6  Last Weight:   Wt Readings from Last 1 Encounters:   23 114 lb 13.8 oz (52.1 kg)     Mental Status:  {IP PT MENTAL STATUS:48593}    IV Access:  { DIANA IV ACCESS:635190965}    Nursing Mobility/ADLs:  Walking   {CHP DME MKSO:245084272}  Transfer  {CHP DME QYSR:413550704}  Bathing  {CHP DME OBJZ:233355730}  Dressing  {CHP DME TOFM:900292508}  Toileting  {CHP DME UGZF:356923849}  Feeding  {CHP DME PQWC:087439985}  Med Admin  {CHP DME EPPZ:082150274}  Med Delivery   { DIANA MED Delivery:922261310}    Wound Care Documentation and Therapy:        Elimination:  Continence:    Bowel: {YES / BT:31640}  Bladder: {YES / GY:77726}  Urinary Catheter: {Urinary Catheter:718068876}   Colostomy/Ileostomy/Ileal Conduit: {YES / MJ:86616}       Date of Last BM: ***    Intake/Output Summary (Last 24 hours) at 2023 1332  Last data filed at 2023 0418  Gross per 24 hour   Intake 100 ml   Output 625 ml   Net -525 ml     I/O last 3 completed shifts:   In: 100 [P.O.:100]  Out: 975 [Urine:975]    Safety Concerns:     [address] Safety Concerns:810559443}    Impairments/Disabilities:      [address] Impairments/Disabilities:177566967}    Nutrition Therapy:  Current Nutrition Therapy:   [address] Diet List:371894772}    Routes of Feeding: {CHP DME Other Feedings:799340194}  Liquids: {Slp liquid thickness:38568}  Daily Fluid Restriction: {CHP DME Yes amt example:626999628}  Last Modified Barium Swallow with Video (Video Swallowing Test): {Done Not Done XERP:224240799}    Treatments at the Time of Hospital Discharge:   Respiratory Treatments: ***  Oxygen Therapy:  {Therapy; copd oxygen:98644}  Ventilator:    {[facility] Vent GWNF:617738057}    Rehab Therapies: {THERAPEUTIC INTERVENTION:1672651097}  Weight Bearing Status/Restrictions: {[facility] Weight Bearin}  Other Medical Equipment (for information only, NOT a DME order):  {EQUIPMENT:167820887}  Other Treatments: ***    Patient's personal belongings (please select all that are sent with patient):  {[facility] DME Belongings:726444864}    RN SIGNATURE:  {Esignature:111204512}    CASE MANAGEMENT/SOCIAL WORK SECTION    Inpatient Status Date: ***    Readmission Risk Assessment Score:  Readmission Risk              Risk of Unplanned Readmission:  10           Discharging to Facility/ Agency   Name:   Address:  Phone:  Fax:    Dialysis Facility (if applicable)   Name:  Address:  Dialysis Schedule:  Phone:  Fax:    / signature: {Esignature:810865363}    PHYSICIAN SECTION    Prognosis: Good    Condition at Discharge: Stable    Rehab Potential (if transferring to Rehab): Good    Recommended Labs or Other Treatments After Discharge: None    Physician Certification: I certify the above information and transfer of Tamiko Montiel  is necessary for the continuing treatment of the diagnosis listed and that she requires Hospice for less than 30 days.      Update Admission H&P: No change in H&P    PHYSICIAN SIGNATURE:  Electronically signed by Ann-Marie Oliva DO on 1/18/23 at 1:32 PM EST

## 2023-01-18 NOTE — DISCHARGE SUMMARY
[facility]  Office: [address], DO, Anne Arshad, DO, Maddie Kemp, DO, Dixie Dowd, DO, Roz Wilder MD, Flaca Guillen MD, Chela Ramesh MD, Nikki Moe MD,  Gisell Israel MD, Angie Beasley MD, Sunday Coker, DO, Saurabh Flowers MD,  Mary Alice Dominguez, DO, Essie Hoyt MD, Taylor Hwang MD, Catherine Blanca, DO, Elroy Tabares MD, Lorie Hercules MD, Nathaniel Settletee, DO, Jacky Ruiz MD, Yokasta Mejia MD, Audria Brunner, MD, Janee Messina MD, Allison Valdez, DO, Cyndy Castleman, MD, Neo Perez MD, Gilbert Longoria, CNP,  Bill Muro, CNP, Yesenia Garcia, CNP, Julia Moran, CNP,  Claude Goods, DNP, Martin Kenny, CNP, Katarzyna Beck, CNP, Em Lino, CNP, Thania Chaudhari, CNP, Ami Mar, CNP, Candice Perez, PA-C, Tip Ovalle, CNS, Glenroy Leak, CNP, Latoya Drake, CNP         [facility]    Discharge Summary     Patient ID: Evin Winter  :  1925   MRN: [id number]     ACCOUNT:  [de-identified]   Patient's PCP: Joshua Butts MD  Admit Date: 2023   Discharge Date: 2023    Length of Stay: 1  Code Status:  DNR-CC  Admitting Physician: Taylor Hwang MD  Discharge Physician: Mkie Hutchison DO     Active Discharge Diagnoses:     Hospital Problem Lists:  Principal Problem:    Recurrent falls  Resolved Problems:    * No resolved hospital problems. *      Admission Condition:  fair     Discharged Condition: good    Hospital Stay:     Hospital Course:  Evin Winter is a 80 y.o. female who was admitted for the management of  Recurrent falls , presented to ER with Fall, Hip Pain, and Head Injury    This very pleasant 26-year-old female presented to the hospital with recurrent falls. She was found to have a sacral fracture and an L5 transverse fracture. She also had multiple age-indeterminate fractures. The patient has relatively advanced dementia and has had recurrent falls.   Her pain was under excellent control with Norco and nonoperative management was recommended. The patient is discharged to skilled nursing facility for continuation of care. Of note she has known paroxysmal atrial fibrillation however given her multiple falls and risk of complications this was discontinued during the course of this admission. Family understood that the patient had a increased risk of stroke by approximately 10% compared to that if her peers. The risk of complications associated with Eliquis especially given her close head injury exceeded the benefits and subsequently Eliquis was discontinued. Hospice ultimately excepted the patient for symptom management. Patient is discharged to hospice in guarded condition. Significant therapeutic interventions: As above    Significant Diagnostic Studies:   Labs / Micro:  CBC:   Lab Results   Component Value Date/Time    WBC 7.4 01/18/2023 06:10 AM    RBC 2.82 01/18/2023 06:10 AM    HGB 8.5 01/18/2023 06:10 AM    HCT 27.1 01/18/2023 06:10 AM    MCV 96.1 01/18/2023 06:10 AM    MCH 30.1 01/18/2023 06:10 AM    MCHC 31.4 01/18/2023 06:10 AM    RDW 15.8 01/18/2023 06:10 AM     01/18/2023 06:10 AM     BMP:    Lab Results   Component Value Date/Time    GLUCOSE 144 01/18/2023 06:10 AM     01/18/2023 06:10 AM    K 3.9 01/18/2023 06:10 AM    CL 95 01/18/2023 06:10 AM    CO2 26 01/18/2023 06:10 AM    ANIONGAP 8 01/18/2023 06:10 AM    BUN 18 01/18/2023 06:10 AM    CREATININE 0.94 01/18/2023 06:10 AM    CALCIUM 8.8 01/18/2023 06:10 AM    LABGLOM 55 01/18/2023 06:10 AM        Radiology:  CT Head W/O Contrast    Result Date: 1/16/2023  No acute intracranial abnormality. Generalized atrophy and chronic small vessel ischemic white matter disease. CT CSpine W/O Contrast    Result Date: 1/16/2023  No acute fracture or traumatic malalignment of the cervical spine.      CT THORACIC SPINE WO CONTRAST    Result Date: 1/16/2023  Multiple compression fractures and vertebroplasties age indeterminate. Multiple pulmonary nodules right lower lobe. Recommend follow-up CT chest with IV contrast non emergently. Differential considerations include infectious, inflammatory, and neoplastic etiologies. Sensitivity and specificity limited due to demineralization. CT LUMBAR SPINE WO CONTRAST    Result Date: 1/17/2023  Acute fracture of the sacrum and L5 transverse process on the left. Multiple compression fractures, age indeterminate. Multiple vertebroplasties and postsurgical changes as above. Demineralization decreases sensitivity and specificity. MRI recommended for further characterization. XR HIP 2-3 VW W PELVIS LEFT    Result Date: 1/16/2023  Possible new fractures superior inferior pubic rami on the left. Right superior and inferior pubic rami fractures unchanged. Sensitivity limited due to demineralization. Postop changes as above. Consultations:    Consults:     Final Specialist Recommendations/Findings:   IP CONSULT TO SOCIAL WORK  IP CONSULT TO SPIRITUAL SERVICES      The patient was seen and examined on day of discharge and this discharge summary is in conjunction with any daily progress note from day of discharge.     Discharge plan:     Disposition: Hospice    Physician Follow Up:     Felipe Tay MD  [address] [place] [address]  [phone]    Follow up         Requiring Further Evaluation/Follow Up POST HOSPITALIZATION/Incidental Findings: None    Diet: regular diet    Activity: As tolerated    Instructions to Patient: None    Discharge Medications:      Medication List        CONTINUE taking these medications      acetaminophen 325 MG tablet  Commonly known as: TYLENOL     cetirizine 10 MG tablet  Commonly known as: ZYRTEC     docusate sodium 100 MG capsule  Commonly known as: COLACE     HYDROcodone-acetaminophen 5-325 MG per tablet  Commonly known as: NORCO  Take 1 tablet by mouth every 6 hours as needed for Pain for up to 7 days. Max Daily Amount: 4 tablets     metoprolol succinate 25 MG extended release tablet  Commonly known as: TOPROL XL            STOP taking these medications      apixaban 2.5 MG Tabs tablet  Commonly known as: ELIQUIS     triamterene-hydroCHLOROthiazide 37.5-25 MG per tablet  Commonly known as: MAXZIDE-25               Where to Get Your Medications        You can get these medications from any pharmacy    Bring a paper prescription for each of these medications  HYDROcodone-acetaminophen 5-325 MG per tablet         No discharge procedures on file. Time Spent on discharge is  36 mins in patient examination, evaluation, counseling as well as medication reconciliation, prescriptions for required medications, discharge plan and follow up. Electronically signed by   Dru Lujan DO  1/18/2023  1:33 PM      Thank you Dr. Brady Ogden MD for the opportunity to be involved in this patient's care.

## 2023-01-18 NOTE — CARE COORDINATION
Transitional Planning    Spoke with Gildardo Christian @ Hospice. Pt has been approved for inpatient hospice for sx management.      Transport ETA [id number]

## 2023-01-18 NOTE — PROGRESS NOTES
Physician Progress Note      Collins Alvarez  CSN #:                  [id number]  :                       1925  ADMIT DATE:       2023 9:52 PM  [address] [place] DATE:  RESPONDING  PROVIDER #:  [facility]          QUERY TEXT:    Pt admitted with L5 transverse process fracture, s/p recurrent falls. If   possible, please document if you are evaluating and/or treating any of the   following: The medical record reflects the following:  Risk Factors: Age, frequent falls,Dementia  Clinical Indicators: Recurrent falls/sacral/L5 transverse process fracture. Age   indeterminant compression fractures. Hx multiple fractures/vertebroplasty's   in past. Hx Osteoarthritis. Calcium level 8.8  Treatment: pain control, monitoring for memory care facility placement    Thank you, Mike HAYES  email - [email or web address]  office hours --7A-3P  Options provided:  -- Pathological L5 transverse process fracture due to osteopenia following   fall which would not usually break a normal, healthy bone  -- Osteoporotic L5 transverse process fracture following fall which would not   usually break a normal, healthy bone  -- Traumatic L5 transverse process fracture  -- Other - I will add my own diagnosis  -- Disagree - Not applicable / Not valid  -- Disagree - Clinically unable to determine / Unknown  -- Refer to Clinical Documentation Reviewer    PROVIDER RESPONSE TEXT:    This patient has a pathological L5 transverse process fracture due to   osteopenia following fall which would not usually break a normal, healthy   bone.     Query created by: Edouard Meza on 2023 1:53 PM      Electronically signed by:  [address] DO 2023 2:20 PM

## 2023-01-18 NOTE — PROGRESS NOTES
[facility]  Office: [address], DO, Indu Carlsonland, DO, Kelechi De Leon, DO, Sari  Blood, DO, Olga Gonzalez MD, Krystal Ching MD, Nick Izaguirre MD, Rei Pretty MD,  Shawn Qureshi MD, Gilles Celaya MD, Isela Lloyd, DO, Sharmin Finch MD,  Greyson Beaver MD, Dayday Ware MD, Florin Pacheco, DO, Mary Ann Ross MD, Jorge Pagan MD, Raul Sherman DO, Raquel Kamara MD, Tasha Booth MD, Jessica Lara MD, Tio Moya MD, Camilo Jay DO, Madhuri Barnhart MD, Amilcar Mauricio MD, Daryle Springer, CNP,  Jose Jama, CNP, Angela Soler, CNP, Christoph Donaldson, CNP,  Phani Paul, [facility], Fidel Hale, CNP, Jovana Sullivan, CNP, Zohreh Saenz, CNP, Victory Coy, CNP, Janelle Yeung, CNP, Becky Lr, PA-C, Waqar Sanabria, CNS, Enrique Conroy, CNP, Alfredo Phillips, [address]    Progress Note    1/18/2023    1:26 PM    Name:   Delfino Zimmerman  MRN:     [id number]     [place]:      [de-identified]   Room:   [address] [place] [address] Day:  1  Admit Date:  1/16/2023  9:52 PM    PCP:   Flavia Salazar MD  Code Status:  DNR-CC    Subjective:     C/C:   Chief Complaint   Patient presents with    Fall    Hip Pain    Head Injury     Patient seen in follow-up for fall, patient states \"I am doing okay\"    Interval History Status: not changed. Patient is doing reasonably well. Her pain is under good control. No major issues overnight. Daughter is at the bedside, multiple questions answered. The patient is on Eliquis for paroxysmal atrial fibrillation. We did discuss the fact that she has had multiple falls in the recent weeks. I did explain that if she suffered any type of head trauma this could be catastrophic. We did discuss that her risk of stroke is about 10% higher than that of her peers. That said I do suspect that the risk of complication from falls exceeds the increased 10% risk of stroke.   At this point in time patient's daughter agrees and we will use Lovenox for DVT prophylaxis however Eliquis will be discontinued. Laboratory data reviewed, the patient continues to have some significant hyponatremia and I am going to discontinue her triamterene hydrochlorothiazide. Her sodium should correct with discontinuation of this particular medication. 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 That said she does tell me that her pain is under excellent control. At this point in time we are working on disposition to a skilled nursing facility with hospice to follow. Patient's daughter denies any questions or concerns at this point in time. Imaging studies reviewed with the patient's daughter at the bedside. Brief History: This very pleasant 68-year-old female who suffers from dementia and was brought to the hospital after a fall. She has had multiple falls and did sustain an acute fracture of the sacrum and L5 transverse process on the left. She has multiple age-indeterminate compression fractures and multiple vertebroplasty's have been noted. This is 1 of many falls and she is weightbearing as tolerated. She was brought from an extended care facility. She was evaluated in the emergency room and family are requesting placement to a memory care unit. At this point in time the patient feels reasonably well. She has pain however it is under reasonable control with Norco.  She can be weightbearing as tolerated and will titrate her pain control as needed. The patient denies any questions or concerns. She is pleasantly confused.     Review of Systems:     Constitutional:  negative for chills, fevers, sweats  Respiratory:  negative for cough, dyspnea on exertion, shortness of breath, wheezing  Cardiovascular:  negative for chest pain, chest pressure/discomfort, lower extremity edema, palpitations  Gastrointestinal:  negative for abdominal pain, constipation, diarrhea, nausea, vomiting  Neurological:  negative for dizziness, headache    Medications: Allergies: Allergies   Allergen Reactions    Aspirin     Penicillins     Tetracyclines & Related        Current Meds:   Scheduled Meds:    [START ON 2023] enoxaparin  30 mg SubCUTAneous Daily    metoprolol succinate  25 mg Oral Daily    sodium chloride flush  5-40 mL IntraVENous 2 times per day     Continuous Infusions:    sodium chloride       PRN Meds: sodium chloride flush, sodium chloride, potassium chloride **OR** potassium alternative oral replacement **OR** potassium chloride, magnesium sulfate, ondansetron **OR** ondansetron, polyethylene glycol, acetaminophen **OR** acetaminophen, HYDROcodone 5 mg - acetaminophen    Data:     Past Medical History:   has a past medical history of Atrial fibrillation ([facility] [address] Cognitive communication deficit, Compression fracture of L2 ([facility] [address] Compression fracture of thoracic vertebra ([facility] [address] Dementia ([facility] [address] Encephalopathy, Fall, Hypertension, Muscle weakness, Osteoarthritis, Pneumonia, Rhabdomyolysis, Rib fractures, Sinusitis, and Spinal stenosis. Social History:   reports that she has never smoked. She has never used smokeless tobacco. She reports that she does not drink alcohol and does not use drugs. Family History: History reviewed. No pertinent family history. Vitals:  /71   Pulse 85   Temp 98.4 °F (36.9 °C) (Oral)   Resp 16   Wt 114 lb 13.8 oz (52.1 kg)   SpO2 99%   BMI 22.43 kg/m²   Temp (24hrs), Av °F (36.7 °C), Min:97.5 °F (36.4 °C), Max:98.4 °F (36.9 °C)    No results for input(s): POCGLU in the last 72 hours. I/O (24Hr):     Intake/Output Summary (Last 24 hours) at 2023 1326  Last data filed at 2023 0418  Gross per 24 hour   Intake 100 ml   Output 625 ml   Net -525 ml       Labs:  Hematology:  Recent Labs     23  0552 23  0610   WBC 8.2 7.4   RBC 2.89* 2.82*   HGB 8.7* 8.5*   HCT 27.1* 27.1*   MCV 93.8 96.1   MCH 30.1 30.1   MCHC 32.1 31.4   RDW 15.5* 15.8*    350   MPV 8.4 8.8   INR 1.0  --      Chemistry:  Recent Labs     01/17/23  0552 01/18/23  0610   * 129*   K 3.9 3.9   CL 96* 95*   CO2 25 26   GLUCOSE 160* 144*   BUN 17 18   CREATININE 0.85 0.94*   ANIONGAP 11 8*   LABGLOM >60 55*   CALCIUM 9.2 8.8   No results for input(s): PROT, LABALBU, LABA1C, F9NSTKG, J5ALXMQ, FT4, TSH, AST, ALT, LDH, GGT, ALKPHOS, LABGGT, BILITOT, BILIDIR, AMMONIA, AMYLASE, LIPASE, LACTATE, CHOL, HDL, LDLCHOLESTEROL, CHOLHDLRATIO, TRIG, VLDL, DOM20SK, PHENYTOIN, PHENYF, URICACID, POCGLU in the last 72 hours. ABG:No results found for: POCPH, PHART, PH, POCPCO2, SOM9NKB, PCO2, POCPO2, PO2ART, PO2, POCHCO3, XVW7GSZ, HCO3, NBEA, PBEA, BEART, BE, THGBART, THB, CHG2IQO, FPAZ6EWV, H8ENMDGP, O2SAT, FIO2  No results found for: SPECIAL  No results found for: CULTURE    Radiology:  CT Head W/O Contrast    Result Date: 1/16/2023  No acute intracranial abnormality. Generalized atrophy and chronic small vessel ischemic white matter disease. CT CSpine W/O Contrast    Result Date: 1/16/2023  No acute fracture or traumatic malalignment of the cervical spine. CT THORACIC SPINE WO CONTRAST    Result Date: 1/16/2023  Multiple compression fractures and vertebroplasties age indeterminate. Multiple pulmonary nodules right lower lobe. Recommend follow-up CT chest with IV contrast non emergently. Differential considerations include infectious, inflammatory, and neoplastic etiologies. Sensitivity and specificity limited due to demineralization. CT LUMBAR SPINE WO CONTRAST    Result Date: 1/17/2023  Acute fracture of the sacrum and L5 transverse process on the left. Multiple compression fractures, age indeterminate. Multiple vertebroplasties and postsurgical changes as above. Demineralization decreases sensitivity and specificity. MRI recommended for further characterization.      XR HIP 2-3 VW W PELVIS LEFT    Result Date: 1/16/2023  Possible new fractures superior inferior pubic rami on the left. Right superior and inferior pubic rami fractures unchanged. Sensitivity limited due to demineralization. Postop changes as above.        Physical Examination:       General appearance:  alert, cooperative and no distress  Mental Status:  oriented to person, place and time and normal affect  Lungs:  clear to auscultation bilaterally, normal effort  Heart:  regular rate and rhythm, no murmur  Abdomen:  soft, nontender, nondistended, normal bowel sounds, no masses, hepatomegaly, splenomegaly  Extremities:  no edema, redness, tenderness in the calves  Skin:  no gross lesions, rashes, induration    Assessment:     Hospital Problems             Last Modified POA    * (Principal) Recurrent falls 1/17/2023 Yes       Plan:     Recurrent falls/sacral/L5 transverse process fracture  Nonoperative management  Family request pain control with ultimate disposition to skilled nursing facility with hospice to follow  Age indeterminant compression fractures  Patient is pain-free, continue [place] for her sacral pain  I did have a long discussion with the patient's daughter regarding vertebroplasty and sacroplasty, given the fact that the patient's pain is under control with Dahlia Check would recommend nonoperative management at this point in time  Paroxysmal atrial fibrillation  Risk of complication with Eliquis exceeds the benefit, at this point in time we will discontinue  Family understand that she has approximately an 10% increase risk of stroke compared to that of her peers  Hyponatremia  DC Maxide  Check morning labs  Dementia  Supportive care  Essential hypertension  Continue Toprol    Discharge to skilled nursing facility when arrangements made    Audelia Urena DO  1/18/2023  1:26 PM

## 2023-01-19 NOTE — PROGRESS NOTES
Patient medicated for pain prior transfer to stretcher for transport. IV removed without complication. Patient transferred to Hospice via stretcher by [facility].